# Patient Record
Sex: MALE | Race: WHITE | Employment: OTHER | URBAN - METROPOLITAN AREA
[De-identification: names, ages, dates, MRNs, and addresses within clinical notes are randomized per-mention and may not be internally consistent; named-entity substitution may affect disease eponyms.]

---

## 2017-07-17 ENCOUNTER — TELEPHONE (OUTPATIENT)
Dept: FAMILY MEDICINE CLINIC | Age: 62
End: 2017-07-17

## 2017-07-17 DIAGNOSIS — R73.01 IFG (IMPAIRED FASTING GLUCOSE): Primary | ICD-10-CM

## 2017-07-17 DIAGNOSIS — E78.00 ELEVATED LDL CHOLESTEROL LEVEL: ICD-10-CM

## 2018-02-28 ENCOUNTER — HOSPITAL ENCOUNTER (OUTPATIENT)
Dept: CT IMAGING | Age: 63
Discharge: HOME | End: 2018-03-02
Payer: COMMERCIAL

## 2018-02-28 DIAGNOSIS — H92.02 OTALGIA OF LEFT EAR: ICD-10-CM

## 2018-02-28 DIAGNOSIS — H93.8X2 CONGESTION OF LEFT EAR: ICD-10-CM

## 2018-02-28 PROCEDURE — 76380 CAT SCAN FOLLOW-UP STUDY: CPT

## 2018-04-17 ENCOUNTER — HOSPITAL ENCOUNTER (OUTPATIENT)
Age: 63
Setting detail: SPECIMEN
Discharge: HOME | End: 2018-04-17
Payer: MEDICARE

## 2018-04-17 DIAGNOSIS — R73.01 IFG (IMPAIRED FASTING GLUCOSE): ICD-10-CM

## 2018-04-17 DIAGNOSIS — E78.00 ELEVATED LDL CHOLESTEROL LEVEL: ICD-10-CM

## 2018-04-17 LAB
ALBUMIN SERPL-MCNC: 4.3 G/DL (ref 3.5–5.2)
ALBUMIN/GLOBULIN RATIO: 1.3 (ref 1–2.5)
ALP BLD-CCNC: 60 U/L (ref 40–129)
ALT SERPL-CCNC: 22 U/L (ref 5–41)
ANION GAP SERPL CALCULATED.3IONS-SCNC: 20 MMOL/L (ref 9–17)
AST SERPL-CCNC: 21 U/L
BILIRUB SERPL-MCNC: 1 MG/DL (ref 0.3–1.2)
BUN BLDV-MCNC: 16 MG/DL (ref 8–23)
BUN/CREAT BLD: ABNORMAL (ref 9–20)
CALCIUM SERPL-MCNC: 9.1 MG/DL (ref 8.6–10.4)
CHLORIDE BLD-SCNC: 99 MMOL/L (ref 98–107)
CHOLESTEROL/HDL RATIO: 4.6
CHOLESTEROL: 198 MG/DL
CO2: 20 MMOL/L (ref 20–31)
CREAT SERPL-MCNC: 1.04 MG/DL (ref 0.7–1.2)
GFR AFRICAN AMERICAN: >60 ML/MIN
GFR NON-AFRICAN AMERICAN: >60 ML/MIN
GFR SERPL CREATININE-BSD FRML MDRD: ABNORMAL ML/MIN/{1.73_M2}
GFR SERPL CREATININE-BSD FRML MDRD: ABNORMAL ML/MIN/{1.73_M2}
GLUCOSE BLD-MCNC: 264 MG/DL (ref 70–99)
HDLC SERPL-MCNC: 43 MG/DL
LDL CHOLESTEROL: 124 MG/DL (ref 0–130)
POTASSIUM SERPL-SCNC: 3.9 MMOL/L (ref 3.7–5.3)
SODIUM BLD-SCNC: 139 MMOL/L (ref 135–144)
TOTAL PROTEIN: 7.6 G/DL (ref 6.4–8.3)
TRIGL SERPL-MCNC: 155 MG/DL
VLDLC SERPL CALC-MCNC: ABNORMAL MG/DL (ref 1–30)

## 2018-04-19 ENCOUNTER — OFFICE VISIT (OUTPATIENT)
Dept: FAMILY MEDICINE CLINIC | Age: 63
End: 2018-04-19
Payer: MEDICARE

## 2018-04-19 VITALS
HEIGHT: 71 IN | SYSTOLIC BLOOD PRESSURE: 134 MMHG | DIASTOLIC BLOOD PRESSURE: 90 MMHG | HEART RATE: 94 BPM | OXYGEN SATURATION: 98 % | WEIGHT: 217.38 LBS | BODY MASS INDEX: 30.43 KG/M2

## 2018-04-19 DIAGNOSIS — Z72.89 OTHER PROBLEMS RELATED TO LIFESTYLE: ICD-10-CM

## 2018-04-19 DIAGNOSIS — E11.9 TYPE 2 DIABETES MELLITUS WITHOUT COMPLICATION, WITHOUT LONG-TERM CURRENT USE OF INSULIN (HCC): Primary | ICD-10-CM

## 2018-04-19 PROCEDURE — G8417 CALC BMI ABV UP PARAM F/U: HCPCS | Performed by: FAMILY MEDICINE

## 2018-04-19 PROCEDURE — 1036F TOBACCO NON-USER: CPT | Performed by: FAMILY MEDICINE

## 2018-04-19 PROCEDURE — 4040F PNEUMOC VAC/ADMIN/RCVD: CPT | Performed by: FAMILY MEDICINE

## 2018-04-19 PROCEDURE — G8427 DOCREV CUR MEDS BY ELIG CLIN: HCPCS | Performed by: FAMILY MEDICINE

## 2018-04-19 PROCEDURE — 2022F DILAT RTA XM EVC RTNOPTHY: CPT | Performed by: FAMILY MEDICINE

## 2018-04-19 PROCEDURE — 1123F ACP DISCUSS/DSCN MKR DOCD: CPT | Performed by: FAMILY MEDICINE

## 2018-04-19 PROCEDURE — 99214 OFFICE O/P EST MOD 30 MIN: CPT | Performed by: FAMILY MEDICINE

## 2018-04-19 PROCEDURE — 3017F COLORECTAL CA SCREEN DOC REV: CPT | Performed by: FAMILY MEDICINE

## 2018-04-19 PROCEDURE — 3046F HEMOGLOBIN A1C LEVEL >9.0%: CPT | Performed by: FAMILY MEDICINE

## 2018-04-19 RX ORDER — METFORMIN HYDROCHLORIDE 500 MG/1
TABLET, EXTENDED RELEASE ORAL
Qty: 120 TABLET | Refills: 11 | Status: SHIPPED | OUTPATIENT
Start: 2018-04-19 | End: 2018-04-19 | Stop reason: SDUPTHER

## 2018-04-19 RX ORDER — METFORMIN HYDROCHLORIDE 500 MG/1
TABLET, EXTENDED RELEASE ORAL
Qty: 120 TABLET | Refills: 11 | Status: SHIPPED | OUTPATIENT
Start: 2018-04-19 | End: 2018-07-31 | Stop reason: SDUPTHER

## 2018-04-19 ASSESSMENT — PATIENT HEALTH QUESTIONNAIRE - PHQ9
1. LITTLE INTEREST OR PLEASURE IN DOING THINGS: 0
SUM OF ALL RESPONSES TO PHQ9 QUESTIONS 1 & 2: 0
2. FEELING DOWN, DEPRESSED OR HOPELESS: 0
SUM OF ALL RESPONSES TO PHQ QUESTIONS 1-9: 0

## 2018-04-20 ENCOUNTER — HOSPITAL ENCOUNTER (OUTPATIENT)
Age: 63
Setting detail: SPECIMEN
Discharge: HOME | End: 2018-04-20
Payer: MEDICARE

## 2018-04-20 DIAGNOSIS — E11.9 TYPE 2 DIABETES MELLITUS WITHOUT COMPLICATION, WITHOUT LONG-TERM CURRENT USE OF INSULIN (HCC): ICD-10-CM

## 2018-04-20 DIAGNOSIS — Z72.89 OTHER PROBLEMS RELATED TO LIFESTYLE: ICD-10-CM

## 2018-04-20 LAB
CHOLESTEROL/HDL RATIO: 5.6
CHOLESTEROL: 208 MG/DL
CREATININE URINE: 323.1 MG/DL (ref 39–259)
ESTIMATED AVERAGE GLUCOSE: 326 MG/DL
HBA1C MFR BLD: 13 % (ref 4–6)
HDLC SERPL-MCNC: 37 MG/DL
HEPATITIS C ANTIBODY: NONREACTIVE
LDL CHOLESTEROL: 145 MG/DL (ref 0–130)
MICROALBUMIN/CREAT 24H UR: 20 MG/L
MICROALBUMIN/CREAT UR-RTO: 6 MCG/MG CREAT
TRIGL SERPL-MCNC: 132 MG/DL
VLDLC SERPL CALC-MCNC: ABNORMAL MG/DL (ref 1–30)

## 2018-04-27 ENCOUNTER — OFFICE VISIT (OUTPATIENT)
Dept: FAMILY MEDICINE CLINIC | Age: 63
End: 2018-04-27
Payer: MEDICARE

## 2018-04-27 VITALS
HEART RATE: 93 BPM | OXYGEN SATURATION: 98 % | DIASTOLIC BLOOD PRESSURE: 82 MMHG | SYSTOLIC BLOOD PRESSURE: 138 MMHG | BODY MASS INDEX: 30.4 KG/M2 | WEIGHT: 218 LBS

## 2018-04-27 DIAGNOSIS — E11.9 TYPE 2 DIABETES MELLITUS WITHOUT COMPLICATION, WITHOUT LONG-TERM CURRENT USE OF INSULIN (HCC): Primary | ICD-10-CM

## 2018-04-27 PROCEDURE — 3046F HEMOGLOBIN A1C LEVEL >9.0%: CPT | Performed by: FAMILY MEDICINE

## 2018-04-27 PROCEDURE — 1036F TOBACCO NON-USER: CPT | Performed by: FAMILY MEDICINE

## 2018-04-27 PROCEDURE — G8427 DOCREV CUR MEDS BY ELIG CLIN: HCPCS | Performed by: FAMILY MEDICINE

## 2018-04-27 PROCEDURE — G8417 CALC BMI ABV UP PARAM F/U: HCPCS | Performed by: FAMILY MEDICINE

## 2018-04-27 PROCEDURE — 3017F COLORECTAL CA SCREEN DOC REV: CPT | Performed by: FAMILY MEDICINE

## 2018-04-27 PROCEDURE — 2022F DILAT RTA XM EVC RTNOPTHY: CPT | Performed by: FAMILY MEDICINE

## 2018-04-27 PROCEDURE — 1123F ACP DISCUSS/DSCN MKR DOCD: CPT | Performed by: FAMILY MEDICINE

## 2018-04-27 PROCEDURE — 99213 OFFICE O/P EST LOW 20 MIN: CPT | Performed by: FAMILY MEDICINE

## 2018-04-27 PROCEDURE — 4040F PNEUMOC VAC/ADMIN/RCVD: CPT | Performed by: FAMILY MEDICINE

## 2018-05-11 ENCOUNTER — HOSPITAL ENCOUNTER (OUTPATIENT)
Dept: DIABETES SERVICES | Age: 63
Setting detail: THERAPIES SERIES
Discharge: HOME | End: 2018-05-11
Payer: MEDICARE

## 2018-05-11 DIAGNOSIS — E11.9 TYPE 2 DIABETES MELLITUS WITHOUT COMPLICATION, WITHOUT LONG-TERM CURRENT USE OF INSULIN (HCC): Primary | ICD-10-CM

## 2018-05-11 PROCEDURE — G0108 DIAB MANAGE TRN  PER INDIV: HCPCS

## 2018-07-24 ENCOUNTER — PATIENT MESSAGE (OUTPATIENT)
Dept: FAMILY MEDICINE CLINIC | Age: 63
End: 2018-07-24

## 2018-07-27 NOTE — TELEPHONE ENCOUNTER
From: Dori Lefort, MD  To: Brigitte Castaneda  Sent: 7/24/2018 9:58 AM EDT  Subject: glucose readings    Overall these look good. Go ahead an get the hba1c now.  It has been 12 weeks        Radhames Valdes M.D.  Kiki Raw [address] [place] [address]    (N) [phone]  (H) [phone]    Electronically signed on 7/24/2018 at 9:57 AM

## 2018-07-30 ENCOUNTER — HOSPITAL ENCOUNTER (OUTPATIENT)
Age: 63
Setting detail: SPECIMEN
Discharge: HOME | End: 2018-07-30
Payer: MEDICARE

## 2018-07-30 DIAGNOSIS — E11.9 TYPE 2 DIABETES MELLITUS WITHOUT COMPLICATION, WITHOUT LONG-TERM CURRENT USE OF INSULIN (HCC): ICD-10-CM

## 2018-07-30 LAB
ESTIMATED AVERAGE GLUCOSE: 117 MG/DL
HBA1C MFR BLD: 5.7 % (ref 4–6)

## 2018-07-31 DIAGNOSIS — E11.9 TYPE 2 DIABETES MELLITUS WITHOUT COMPLICATION, WITHOUT LONG-TERM CURRENT USE OF INSULIN (HCC): Primary | Chronic | ICD-10-CM

## 2018-07-31 RX ORDER — METFORMIN HYDROCHLORIDE 500 MG/1
TABLET, EXTENDED RELEASE ORAL
Qty: 60 TABLET | Refills: 11 | Status: SHIPPED | OUTPATIENT
Start: 2018-07-31 | End: 2019-02-06 | Stop reason: SDUPTHER

## 2018-08-02 ENCOUNTER — OFFICE VISIT (OUTPATIENT)
Dept: FAMILY MEDICINE CLINIC | Age: 63
End: 2018-08-02
Payer: MEDICARE

## 2018-08-02 VITALS
DIASTOLIC BLOOD PRESSURE: 82 MMHG | BODY MASS INDEX: 27.89 KG/M2 | SYSTOLIC BLOOD PRESSURE: 136 MMHG | HEART RATE: 78 BPM | WEIGHT: 200 LBS | OXYGEN SATURATION: 98 %

## 2018-08-02 DIAGNOSIS — E11.9 TYPE 2 DIABETES MELLITUS WITHOUT COMPLICATION, WITHOUT LONG-TERM CURRENT USE OF INSULIN (HCC): Primary | ICD-10-CM

## 2018-08-02 PROCEDURE — 1036F TOBACCO NON-USER: CPT | Performed by: FAMILY MEDICINE

## 2018-08-02 PROCEDURE — 1101F PT FALLS ASSESS-DOCD LE1/YR: CPT | Performed by: FAMILY MEDICINE

## 2018-08-02 PROCEDURE — 99213 OFFICE O/P EST LOW 20 MIN: CPT | Performed by: FAMILY MEDICINE

## 2018-08-02 PROCEDURE — G8427 DOCREV CUR MEDS BY ELIG CLIN: HCPCS | Performed by: FAMILY MEDICINE

## 2018-08-02 PROCEDURE — 3044F HG A1C LEVEL LT 7.0%: CPT | Performed by: FAMILY MEDICINE

## 2018-08-02 PROCEDURE — 3017F COLORECTAL CA SCREEN DOC REV: CPT | Performed by: FAMILY MEDICINE

## 2018-08-02 PROCEDURE — 4040F PNEUMOC VAC/ADMIN/RCVD: CPT | Performed by: FAMILY MEDICINE

## 2018-08-02 PROCEDURE — 2022F DILAT RTA XM EVC RTNOPTHY: CPT | Performed by: FAMILY MEDICINE

## 2018-08-02 PROCEDURE — G8417 CALC BMI ABV UP PARAM F/U: HCPCS | Performed by: FAMILY MEDICINE

## 2018-08-02 PROCEDURE — 1123F ACP DISCUSS/DSCN MKR DOCD: CPT | Performed by: FAMILY MEDICINE

## 2018-08-02 NOTE — PROGRESS NOTES
Visit Information    Have you changed or started any medications since your last visit including any over-the-counter medicines, vitamins, or herbal medicines? no   Are you having any side effects from any of your medications? -  no  Have you stopped taking any of your medications? Is so, why? -  no    Have you seen any other physician or provider since your last visit? No  Have you had any other diagnostic tests since your last visit? No  Have you been seen in the emergency room and/or had an admission to a hospital since we last saw you? No  Have you had your routine dental cleaning in the past 6 months? yes -     Have you activated your [employer] account? If not, what are your barriers?  Yes     Patient Care Team:  Kristi Cummins MD as PCP - General    Medical History Review  Past Medical, Family, and Social History reviewed and does not contribute to the patient presenting condition    Health Maintenance   Topic Date Due    Diabetic foot exam  03/13/1963    Diabetic retinal exam  03/13/1963    HIV screen  03/13/1968    Shingles Vaccine (1 of 2 - 2 Dose Series) 03/13/2003    Pneumococcal low/med risk (1 of 2 - PCV13) 03/13/2018    Flu vaccine (1) 09/01/2018    Diabetic microalbuminuria test  04/20/2019    Lipid screen  04/20/2019    A1C test (Diabetic or Prediabetic)  07/30/2019    Colon cancer screen colonoscopy  12/17/2020    DTaP/Tdap/Td vaccine (2 - Td) 09/04/2024    Hepatitis C screen  Completed

## 2018-08-07 ENCOUNTER — HOSPITAL ENCOUNTER (OUTPATIENT)
Dept: DIABETES SERVICES | Age: 63
Setting detail: THERAPIES SERIES
Discharge: HOME | End: 2018-08-07
Payer: MEDICARE

## 2018-08-07 PROCEDURE — G0108 DIAB MANAGE TRN  PER INDIV: HCPCS

## 2018-08-07 NOTE — PROGRESS NOTES
Identify symptoms of hyper & hypoglycemia, and prevention & treatment strategies. 1 8/7/18RS   Was having symptoms of high BG - frequency and thrist in march prior to DX    8/7/18RS- states now feeling great - wife feels not eating enough on the days he is fasting and is grumpy      Describe sick day guidelines & indications for  physician notification. Identify short term consequences of poor control. 1       Prevention, detection & treatment of chronic complications:  Define the natural course of diabetes & describe the relationship of blood glucose levels to long term complications of diabetes. Identify preventative measures & standards of care. 1 8/7/18RS   High cholesterol - --   Lab Results   Component Value Date    LDLCALC 122 01/05/2015    LDLCHOLESTEROL 145 (H) 04/20/2018   Increased intake of steak and meats to lower BG - discussed that balance and lean meats - 8/7/18RS   Developing strategies to address psychosocial issues:  Describe feelings about living with diabetes; Describe how stress, depression & anxiety affect blood glucose; Identify coping strategies; Identify support needed & support network available. 1 8/7/18RS   Very motivated to change     Wife Mort Cam is very supportive    8/7/18RS- action oriented and does not want to \"be diabetic\"   Developing strategies to promote health/change behavior: Identify 7 self-care behaviors; Personal health risk factors; Benefits, challenges & strategies for behavioral change;    1         Individualized goal selection. My goal , to help me improve my health, I will:   Healthy Eating     1.practice eating patterns - 3 meals ( add back in a breakfast)     2. Veggies and berries for snacks       Plan  Follow-up Appointments planned in individual setting. Next Appointment in 3 months.     Instruction Method: [x]Lecture/Discussion  []Power Point Presentation  [x]Handouts  []Return Demonstration      Education Materials/Equipment Provided: 1 - Understanding diabetes 8/7/18RS  8 30   900  x with wife Agustin    Class 2- Nutrition and diabetes          Class 3 - Preventing Complications         Class 4 -  In depth Nutrition and sick day care        Class 5 - 3 month follow up / goal reassessment        Gestational - RN         Gestational - RD        Individual MNT         Shared Med Appt         Yearly Follow-up        Meter Instrx        Insulin Instrx      []Pen  []Vial & Syringe      DSMS Support Plan:  Follow-up plan:     [] MNT referral request / Appointment     [] Annual update referral request and appointment  after      [x]ADA  Where do I Begin, Living with Type 2 diabetes ADA home support program--5/11/18RS   [] Healthy U - Diabetes workshops via [facility]        [] Starting [employer] program /  [employer] [phone]    []  Support Group / [employer] - Free 6 week diabetes education support   classes Almaz Mares [phone]      []   [employer] application  / scholarship program     []ADA care 4 life facundo      []  Internet web sites - ADA and D- life    Post Education Referrals:      [] [facility] information sheet and [address] [facility] , 21       [] Dental care - Dental care of [employer]     [] [facility] ([facility]) link  phone number - for information and referral to [facility]  Clinically  [address], WEIGHT MANAGEMENT        []Other  Kaykay Brasher RN

## 2018-09-04 ENCOUNTER — HOSPITAL ENCOUNTER (OUTPATIENT)
Dept: DIABETES SERVICES | Age: 63
Setting detail: THERAPIES SERIES
Discharge: HOME | End: 2018-09-04
Payer: MEDICARE

## 2018-09-04 ENCOUNTER — PATIENT MESSAGE (OUTPATIENT)
Dept: FAMILY MEDICINE CLINIC | Age: 63
End: 2018-09-04

## 2018-09-04 DIAGNOSIS — E11.9 TYPE 2 DIABETES MELLITUS WITHOUT COMPLICATION, WITHOUT LONG-TERM CURRENT USE OF INSULIN (HCC): Primary | ICD-10-CM

## 2018-09-04 DIAGNOSIS — E11.9 TYPE 2 DIABETES MELLITUS WITHOUT COMPLICATION, WITHOUT LONG-TERM CURRENT USE OF INSULIN (HCC): Primary | Chronic | ICD-10-CM

## 2018-09-04 PROCEDURE — G0108 DIAB MANAGE TRN  PER INDIV: HCPCS

## 2018-09-04 NOTE — TELEPHONE ENCOUNTER
From: Christina Winslow MD  To: Jayesh Woodall  Sent: 9/4/2018 6:37 AM EDT  Subject: glucose    Your readings look good. Watch for the hypoglycemia.         Diana Ny M.D.  Alexandra Souza [address] [place] [address], [facility]    R) [phone] (e) [phone]    Electronically signed on 9/4/2018 at 6:37 AM

## 2018-09-04 NOTE — PROGRESS NOTES
fasting 16 to 24 hours on tues and thurs-- plan follow up discussion with RD on his plan  He stated eating lots of low CHO veggies - meats and nuts - some berries - use of stevia in tea  Wants to know if any professional ( MD)  to talk to about nutrients / nutrition medicaine    Didn't give carb plan as what patient is doing is working. calories/ day   CHO choices/ meal   CHO choices/  day   grams of protein /day   gram of fat /day     Correctly read food labels & demonstrate CHO counting & portion control with personalized meal plan. Identify dining out strategies, & dietary sick day guidelines. 1 9/4/18 JW      Incorporating physical activity into lifestyle:   Verbalize effect of exercise on blood glucose levels; benefits of regular exercise; safety considerations; contraindications; maintenance of activity. 1 8/7/18RS    very active base line - [employer] yoga walking swimming   Using medications safely:  Identify effects of diabetes medicines on blood glucose levels; List diabetes medication taken, action & side effects;    1    Started on metformin - taking 2 x per day 500mg  Does not want to increase dose  If BG ok and would like to not have to take    8/7/18RS- dose on metformin decreased by 1/2 now only 500mg in am and pm    Insulin / Injectable - Appropriate injection sites; proper storage; supplies needed; proper technique; safe needle disposal guidelines. 1    Does not want insulin    Briefly discussed GLP 1 RA    Monitoring blood glucose, interpreting and using results:  Identify recommended & personal blood glucose targets; importance of testing; testing supplies; HgbA1C target levels; Factors affecting blood glucose;  Importance of logging blood glucose levels for pattern recognition; ketone testing; safe lancet disposal.   1 8/7/18RS    purchases own BG meter and strips and is checking -  Fasting are in low 100's now     8/7/18RS--Checking BG on his own - noted higher BG in wake up ( ie be from NDE      []Self-Management Gestational - RD class - My Food Plan for Gestational diabetes    []Glucose Meter     []Insulin Kit     []Other      Encounter Type Date Start Time End Time Comments No Show Dates   Assessment 5/11/18RS   8 30   9 30   [x]In Person  []Telephone    Class 1 - Understanding diabetes 8/7/18RS  8 27   900  x with wife Agustin    Class 2- Nutrition and diabetes   9/4/18 JW 8:30 9:30 x    Class 3 - Preventing Complications         Class 4 -  In depth Nutrition and sick day care        Class 5 - 3 month follow up / goal reassessment        Gestational - RN         Gestational - RD        Individual MNT         Shared Med Appt         Yearly Follow-up        Meter Instrx        Insulin Instrx      []Pen  []Vial & Syringe      DSMS Support Plan:  Follow-up plan:     [] MNT referral request / Appointment     [] Annual update referral request and appointment  after      [x]ADA  Where do I Begin, Living with Type 2 diabetes ADA home support program--5/11/18RS   [] Healthy U - Diabetes workshops via [address] [place] [address]       [] Starting [employer] program /  [employer] [phone]    []  Support Group / Alvino Brandon [address] 6 week diabetes education support   classes Cole Jordon [phone]      []   [employer] application  / scholarship program     []ADA care 4 life facundo      []  Internet web sites - ADA and D- life    Post Education Referrals:      [] [facility] information sheet and [address] [facility], [facility] , 21       [] Dental care - Dental care of [employer]     [] [facility] ([facility]) link  phone number - for information and referral to [facility]  Clinically  [address], WOUND, WEIGHT MANAGEMENT        []Other  Loi Tobin, RD, LD

## 2018-09-10 ENCOUNTER — TELEPHONE (OUTPATIENT)
Dept: FAMILY MEDICINE CLINIC | Age: 63
End: 2018-09-10

## 2018-09-10 DIAGNOSIS — E11.9 CONTROLLED TYPE 2 DIABETES MELLITUS WITHOUT COMPLICATION, WITHOUT LONG-TERM CURRENT USE OF INSULIN (HCC): Primary | ICD-10-CM

## 2018-09-10 RX ORDER — FLASH GLUCOSE SENSOR
KIT MISCELLANEOUS
Qty: 1 DEVICE | Refills: 1 | Status: ON HOLD | OUTPATIENT
Start: 2018-09-10 | End: 2020-01-04 | Stop reason: HOSPADM

## 2018-09-10 RX ORDER — FLASH GLUCOSE SENSOR
KIT MISCELLANEOUS
Qty: 1 DEVICE | Refills: 3 | Status: SHIPPED | OUTPATIENT
Start: 2018-09-10 | End: 2018-09-10 | Stop reason: SDUPTHER

## 2018-09-10 RX ORDER — FLASH GLUCOSE SENSOR
KIT MISCELLANEOUS
Qty: 3 EACH | Refills: 3 | Status: SHIPPED | OUTPATIENT
Start: 2018-09-10 | End: 2019-01-28 | Stop reason: SDUPTHER

## 2018-09-10 RX ORDER — FLASH GLUCOSE SENSOR
KIT MISCELLANEOUS
Qty: 1 EACH | Refills: 5 | Status: SHIPPED | OUTPATIENT
Start: 2018-09-10 | End: 2018-09-10 | Stop reason: SDUPTHER

## 2018-09-11 NOTE — TELEPHONE ENCOUNTER
SPoke with pharmacist who stated that insurance did cover a portion for the meter and sensors. Called patient to inform him of this as patient stated yesterday the pharmacist figured it out. Insurance did end up paying a portion.

## 2018-10-09 ENCOUNTER — HOSPITAL ENCOUNTER (OUTPATIENT)
Dept: DIABETES SERVICES | Age: 63
Setting detail: THERAPIES SERIES
Discharge: HOME | End: 2018-10-09
Payer: MEDICARE

## 2018-10-09 DIAGNOSIS — E11.9 TYPE 2 DIABETES MELLITUS WITHOUT COMPLICATION, WITHOUT LONG-TERM CURRENT USE OF INSULIN (HCC): Primary | ICD-10-CM

## 2018-10-09 PROCEDURE — G0108 DIAB MANAGE TRN  PER INDIV: HCPCS

## 2018-10-09 NOTE — PROGRESS NOTES
of meals and snacks, and How much to eat - portions control. encouraged more intake at [facility] - try smoothie with kefir or egg white with veggies  - especially if going to exercise    8/7/18RS-Now fasting 16 to 24 hours on tues and thurs-- plan follow up discussion with RD on his plan  He stated eating lots of low CHO veggies - meats and nuts - some berries - use of stevia in tea  Wants to know if any professional ( MD)  to talk to about nutrients / nutrition medicaine    Didn't give carb plan as what patient is doing is working. calories/ day   CHO choices/ meal   CHO choices/  day   grams of protein /day   gram of fat /day     Correctly read food labels & demonstrate CHO counting & portion control with personalized meal plan. Identify dining out strategies, & dietary sick day guidelines. 1 9/4/18 JW      Incorporating physical activity into lifestyle:   Verbalize effect of exercise on blood glucose levels; benefits of regular exercise; safety considerations; contraindications; maintenance of activity. 1 8/7/18RS    very active base line - [employer] yoga walking swimming   Using medications safely:  Identify effects of diabetes medicines on blood glucose levels; List diabetes medication taken, action & side effects;    1 10/9/18rs   Started on metformin - taking 2 x per day 500mg  Does not want to increase dose  If BG ok and would like to not have to take    8/7/18RS- dose on metformin decreased by 1/2 now only 500mg in am and     Discussed oral DM meds in general - patient hopes to get off all medications   Insulin / Injectable - Appropriate injection sites; proper storage; supplies needed; proper technique; safe needle disposal guidelines. 1 10/9/18rs   Does not want insulin    Briefly discussed GLP 1 RA    Monitoring blood glucose, interpreting and using results:  Identify recommended & personal blood glucose targets; importance of testing; testing supplies; HgbA1C target levels;  Factors affecting blood

## 2018-11-01 ENCOUNTER — HOSPITAL ENCOUNTER (OUTPATIENT)
Age: 63
Setting detail: SPECIMEN
Discharge: HOME | End: 2018-11-01
Payer: MEDICARE

## 2018-11-01 DIAGNOSIS — E11.9 TYPE 2 DIABETES MELLITUS WITHOUT COMPLICATION, WITHOUT LONG-TERM CURRENT USE OF INSULIN (HCC): Chronic | ICD-10-CM

## 2018-11-01 LAB
ESTIMATED AVERAGE GLUCOSE: 108 MG/DL
HBA1C MFR BLD: 5.4 % (ref 4–6)

## 2018-11-13 ENCOUNTER — TELEPHONE (OUTPATIENT)
Dept: DIABETES SERVICES | Age: 63
End: 2018-11-13

## 2019-01-28 DIAGNOSIS — E11.9 CONTROLLED TYPE 2 DIABETES MELLITUS WITHOUT COMPLICATION, WITHOUT LONG-TERM CURRENT USE OF INSULIN (HCC): ICD-10-CM

## 2019-01-28 RX ORDER — FLASH GLUCOSE SENSOR
KIT MISCELLANEOUS
Qty: 100 EACH | Refills: 2 | Status: ON HOLD | OUTPATIENT
Start: 2019-01-28 | End: 2020-01-04 | Stop reason: HOSPADM

## 2019-02-05 ENCOUNTER — OFFICE VISIT (OUTPATIENT)
Dept: FAMILY MEDICINE CLINIC | Age: 64
End: 2019-02-05
Payer: MEDICARE

## 2019-02-05 ENCOUNTER — HOSPITAL ENCOUNTER (OUTPATIENT)
Age: 64
Setting detail: SPECIMEN
Discharge: HOME | End: 2019-02-05
Payer: MEDICARE

## 2019-02-05 VITALS
HEART RATE: 78 BPM | SYSTOLIC BLOOD PRESSURE: 130 MMHG | OXYGEN SATURATION: 98 % | WEIGHT: 213 LBS | BODY MASS INDEX: 29.71 KG/M2 | DIASTOLIC BLOOD PRESSURE: 84 MMHG

## 2019-02-05 DIAGNOSIS — E11.9 CONTROLLED TYPE 2 DIABETES MELLITUS WITHOUT COMPLICATION, WITHOUT LONG-TERM CURRENT USE OF INSULIN (HCC): ICD-10-CM

## 2019-02-05 DIAGNOSIS — E11.9 CONTROLLED TYPE 2 DIABETES MELLITUS WITHOUT COMPLICATION, WITHOUT LONG-TERM CURRENT USE OF INSULIN (HCC): Primary | ICD-10-CM

## 2019-02-05 LAB
ABSOLUTE EOS #: 0.05 K/UL (ref 0–0.44)
ABSOLUTE IMMATURE GRANULOCYTE: <0.03 K/UL (ref 0–0.3)
ABSOLUTE LYMPH #: 1.45 K/UL (ref 1.1–3.7)
ABSOLUTE MONO #: 0.57 K/UL (ref 0.1–1.2)
ALBUMIN SERPL-MCNC: 4.5 G/DL (ref 3.5–5.2)
ALBUMIN/GLOBULIN RATIO: 1.6 (ref 1–2.5)
ALP BLD-CCNC: 49 U/L (ref 40–129)
ALT SERPL-CCNC: 17 U/L (ref 5–41)
ANION GAP SERPL CALCULATED.3IONS-SCNC: 10 MMOL/L (ref 9–17)
AST SERPL-CCNC: 21 U/L
BASOPHILS # BLD: 1 % (ref 0–2)
BASOPHILS ABSOLUTE: 0.05 K/UL (ref 0–0.2)
BILIRUB SERPL-MCNC: 0.53 MG/DL (ref 0.3–1.2)
BUN BLDV-MCNC: 21 MG/DL (ref 8–23)
BUN/CREAT BLD: ABNORMAL (ref 9–20)
CALCIUM SERPL-MCNC: 9.5 MG/DL (ref 8.6–10.4)
CHLORIDE BLD-SCNC: 105 MMOL/L (ref 98–107)
CHOLESTEROL/HDL RATIO: 3.5
CHOLESTEROL: 178 MG/DL
CO2: 24 MMOL/L (ref 20–31)
CREAT SERPL-MCNC: 1.21 MG/DL (ref 0.7–1.2)
CREATININE URINE: 62.4 MG/DL (ref 39–259)
DIFFERENTIAL TYPE: NORMAL
EOSINOPHILS RELATIVE PERCENT: 1 % (ref 1–4)
ESTIMATED AVERAGE GLUCOSE: 117 MG/DL
GFR AFRICAN AMERICAN: >60 ML/MIN
GFR NON-AFRICAN AMERICAN: >60 ML/MIN
GFR SERPL CREATININE-BSD FRML MDRD: ABNORMAL ML/MIN/{1.73_M2}
GFR SERPL CREATININE-BSD FRML MDRD: ABNORMAL ML/MIN/{1.73_M2}
GLUCOSE BLD-MCNC: 108 MG/DL (ref 70–99)
HBA1C MFR BLD: 5.7 % (ref 4–6)
HCT VFR BLD CALC: 46.6 % (ref 40.7–50.3)
HDLC SERPL-MCNC: 51 MG/DL
HEMOGLOBIN: 15.5 G/DL (ref 13–17)
IMMATURE GRANULOCYTES: 0 %
LDL CHOLESTEROL: 117 MG/DL (ref 0–130)
LYMPHOCYTES # BLD: 26 % (ref 24–43)
MCH RBC QN AUTO: 31.3 PG (ref 25.2–33.5)
MCHC RBC AUTO-ENTMCNC: 33.3 G/DL (ref 28.4–34.8)
MCV RBC AUTO: 94 FL (ref 82.6–102.9)
MICROALBUMIN/CREAT 24H UR: <12 MG/L
MICROALBUMIN/CREAT UR-RTO: NORMAL MCG/MG CREAT
MONOCYTES # BLD: 10 % (ref 3–12)
NRBC AUTOMATED: 0 PER 100 WBC
PDW BLD-RTO: 12.5 % (ref 11.8–14.4)
PLATELET # BLD: 177 K/UL (ref 138–453)
PLATELET ESTIMATE: NORMAL
PMV BLD AUTO: 10.9 FL (ref 8.1–13.5)
POTASSIUM SERPL-SCNC: 4.8 MMOL/L (ref 3.7–5.3)
RBC # BLD: 4.96 M/UL (ref 4.21–5.77)
RBC # BLD: NORMAL 10*6/UL
SEG NEUTROPHILS: 62 % (ref 36–65)
SEGMENTED NEUTROPHILS ABSOLUTE COUNT: 3.39 K/UL (ref 1.5–8.1)
SODIUM BLD-SCNC: 139 MMOL/L (ref 135–144)
TOTAL PROTEIN: 7.3 G/DL (ref 6.4–8.3)
TRIGL SERPL-MCNC: 52 MG/DL
VLDLC SERPL CALC-MCNC: NORMAL MG/DL (ref 1–30)
WBC # BLD: 5.5 K/UL (ref 3.5–11.3)
WBC # BLD: NORMAL 10*3/UL

## 2019-02-05 PROCEDURE — 2022F DILAT RTA XM EVC RTNOPTHY: CPT | Performed by: FAMILY MEDICINE

## 2019-02-05 PROCEDURE — 4040F PNEUMOC VAC/ADMIN/RCVD: CPT | Performed by: FAMILY MEDICINE

## 2019-02-05 PROCEDURE — 1123F ACP DISCUSS/DSCN MKR DOCD: CPT | Performed by: FAMILY MEDICINE

## 2019-02-05 PROCEDURE — 1101F PT FALLS ASSESS-DOCD LE1/YR: CPT | Performed by: FAMILY MEDICINE

## 2019-02-05 PROCEDURE — G8484 FLU IMMUNIZE NO ADMIN: HCPCS | Performed by: FAMILY MEDICINE

## 2019-02-05 PROCEDURE — G8427 DOCREV CUR MEDS BY ELIG CLIN: HCPCS | Performed by: FAMILY MEDICINE

## 2019-02-05 PROCEDURE — 1036F TOBACCO NON-USER: CPT | Performed by: FAMILY MEDICINE

## 2019-02-05 PROCEDURE — 99213 OFFICE O/P EST LOW 20 MIN: CPT | Performed by: FAMILY MEDICINE

## 2019-02-05 PROCEDURE — 3017F COLORECTAL CA SCREEN DOC REV: CPT | Performed by: FAMILY MEDICINE

## 2019-02-05 PROCEDURE — 3046F HEMOGLOBIN A1C LEVEL >9.0%: CPT | Performed by: FAMILY MEDICINE

## 2019-02-05 PROCEDURE — G8417 CALC BMI ABV UP PARAM F/U: HCPCS | Performed by: FAMILY MEDICINE

## 2019-02-06 ENCOUNTER — PATIENT MESSAGE (OUTPATIENT)
Dept: FAMILY MEDICINE CLINIC | Age: 64
End: 2019-02-06

## 2019-02-06 DIAGNOSIS — E11.9 TYPE 2 DIABETES MELLITUS WITHOUT COMPLICATION, WITHOUT LONG-TERM CURRENT USE OF INSULIN (HCC): Primary | Chronic | ICD-10-CM

## 2019-02-06 RX ORDER — METFORMIN HYDROCHLORIDE 500 MG/1
TABLET, EXTENDED RELEASE ORAL
Qty: 30 TABLET | Refills: 11 | Status: SHIPPED | OUTPATIENT
Start: 2019-02-06 | End: 2019-06-21 | Stop reason: SDUPTHER

## 2019-03-11 DIAGNOSIS — E11.8 TYPE 2 DIABETES MELLITUS WITH COMPLICATION, UNSPECIFIED WHETHER LONG TERM INSULIN USE: Primary | ICD-10-CM

## 2019-03-11 RX ORDER — FLASH GLUCOSE SCANNING READER
1 EACH MISCELLANEOUS CONTINUOUS
Qty: 1 DEVICE | Refills: 11 | Status: SHIPPED | OUTPATIENT
Start: 2019-03-11

## 2019-03-11 RX ORDER — FLASH GLUCOSE SENSOR
KIT MISCELLANEOUS
Qty: 2 EACH | Refills: 11 | Status: SHIPPED | OUTPATIENT
Start: 2019-03-11

## 2019-06-21 RX ORDER — METFORMIN HYDROCHLORIDE 500 MG/1
TABLET, EXTENDED RELEASE ORAL
Qty: 30 TABLET | Refills: 11 | Status: SHIPPED | OUTPATIENT
Start: 2019-06-21 | End: 2020-01-03 | Stop reason: SDUPTHER

## 2019-06-21 NOTE — TELEPHONE ENCOUNTER
Last visit: 2/5/19  Last Med refill: 2/6/19  Does patient have enough medication for 72 hours:     Next Visit Date:  Future Appointments   Date Time Provider Shaniqua Perez   8/6/2019  8:00 AM Genny Koyanagi,  [address] [facility]   Topic Date Due    Diabetic foot exam  03/13/1963    Diabetic retinal exam  03/13/1963    Annual Wellness Visit (AWV)  03/13/2016    Shingles Vaccine (1 of 2) 12/31/2019 (Originally 3/13/2003)    Pneumococcal 65+ years Vaccine (2 of 2 - PPSV23) 10/01/2019    A1C test (Diabetic or Prediabetic)  02/05/2020    Diabetic microalbuminuria test  02/05/2020    Lipid screen  02/05/2020    Colon cancer screen colonoscopy  12/17/2020    DTaP/Tdap/Td vaccine (2 - Td) 09/04/2024    Flu vaccine  Completed    Hepatitis C screen  Completed       Hemoglobin A1C (%)   Date Value   02/05/2019 5.7   11/01/2018 5.4   07/30/2018 5.7             ( goal A1C is < 7)   Microalb/Crt.  Ratio (mcg/mg creat)   Date Value   02/05/2019 CANNOT BE CALCULATED     LDL Cholesterol (mg/dL)   Date Value   02/05/2019 117   04/20/2018 145 (H)     LDL Calculated (mg/dL)   Date Value   01/05/2015 122   04/30/2012 61       (goal LDL is <100)   AST (U/L)   Date Value   02/05/2019 21     ALT (U/L)   Date Value   02/05/2019 17     BUN (mg/dL)   Date Value   02/05/2019 21     BP Readings from Last 3 Encounters:   02/05/19 130/84   08/02/18 136/82   04/27/18 138/82          (goal 120/80)    All Future Testing planned in [facility]  Lab Frequency Next Occurrence   Hemoglobin A1C Once 05/06/2019               Patient Active Problem List:     Hyperlipidemia     Orthostatic hypotension     Type 2 diabetes mellitus without complication, without long-term current use of insulin ([address]

## 2019-06-25 ENCOUNTER — HOSPITAL ENCOUNTER (EMERGENCY)
Age: 64
Discharge: LEFT BEFORE DISCHARGE | End: 2019-06-25
Attending: EMERGENCY MEDICINE
Payer: MEDICARE

## 2019-06-25 VITALS
HEIGHT: 71 IN | DIASTOLIC BLOOD PRESSURE: 80 MMHG | RESPIRATION RATE: 16 BRPM | SYSTOLIC BLOOD PRESSURE: 135 MMHG | HEART RATE: 74 BPM | TEMPERATURE: 98.1 F | WEIGHT: 222 LBS | BODY MASS INDEX: 31.08 KG/M2 | OXYGEN SATURATION: 99 %

## 2019-06-25 DIAGNOSIS — Z53.21 ELOPED FROM EMERGENCY DEPARTMENT: Primary | ICD-10-CM

## 2019-06-25 PROCEDURE — 99283 EMERGENCY DEPT VISIT LOW MDM: CPT

## 2019-06-25 ASSESSMENT — PAIN DESCRIPTION - LOCATION: LOCATION: KNEE

## 2019-06-25 ASSESSMENT — PAIN DESCRIPTION - ORIENTATION: ORIENTATION: LEFT

## 2019-06-25 ASSESSMENT — PAIN SCALES - GENERAL: PAINLEVEL_OUTOF10: 10

## 2019-06-25 ASSESSMENT — ENCOUNTER SYMPTOMS: COLOR CHANGE: 0

## 2019-06-25 NOTE — ED PROVIDER NOTES
[address] [place] [address] ED  eMERGENCY dEPARTMENT eNCOUnter      Pt Name: Radha Schwarz  MRN: [id number]  [place] 1955  Date of evaluation: 6/25/2019  Provider: ARMANI Zapata [id number]       Chief Complaint   Patient presents with    Knee Injury     right         HISTORY OFPRESENT ILLNESS  (Location/Symptom, Timing/Onset, Context/Setting, Quality, Duration, Modifying Factors, Severity.)   Radha Schwarz is a 75 y.o. male who presents to the emergency department patient states he was walking up a step today and felt a pop in the back of his right knee. He has had pain and swelling since incident. Pain is a 10 out of 10. He states he can bear weight on his right knee. Presents to the emergency department demanding to have an MRI of his right knee. Nursing Notes were reviewed. PASTMEDICAL HISTORY     Past Medical History:   Diagnosis Date    Cardiac syncope 12    Hyperlipidemia          SURGICAL HISTORY       Past Surgical History:   Procedure Laterality Date    ANKLE SURGERY Left 1970    COLONOSCOPY  2010    INGUINAL HERNIA REPAIR Right 1987    LASIK  1999    NASAL SEPTUM SURGERY  1994    TONSILLECTOMY  1971         CURRENT MEDICATIONS     Previous Medications    ASPIRIN 81 MG TABLET    Take 81 mg by mouth daily.  Three times a week    CITALOPRAM (CELEXA) 20 MG TABLET    Take 20 mg by mouth daily     CONTINUOUS BLOOD GLUC  (FREESTYLE CASSIE 14 DAY READER) SHIRIN    1 each by Does not apply route continuous    CONTINUOUS BLOOD GLUC  (FREESTYLE CASSIE READER) SHIRIN    Test BS once daily    CONTINUOUS BLOOD GLUC SENSOR (FREESTYLE CASSIE 14 DAY SENSOR) MISC    Change every 14 days    CONTINUOUS BLOOD GLUC SENSOR (FREESTYLE CASSIE SENSOR SYSTEM) MISC    TEST ONCE DAILY    DICLOFENAC (VOLTAREN) 75 MG EC TABLET    TAKE ONE TABLET BY MOUTH TWICE A DAY    FLUDROCORTISONE (FLORINEF) 0.1 MG TABLET    Take 0.1 mg by mouth 2 times daily     METFORMIN (GLUCOPHAGE-XR) 500 MG EXTENDED RELEASE TABLET    q am pc    VOLTAREN 1 % GEL    as needed        ALLERGIES     Erythromycin    FAMILY HISTORY     History reviewed. No pertinent family history. SOCIAL HISTORY       Social History     Socioeconomic History    Marital status:      Spouse name: None    Number of children: 3    Years of education: 25    Highest education level: None   Occupational History    Occupation: yuback     Employer: [employer] [address] [employer] [address]: retired since [address]  resource strain: None    Food insecurity:     Worry: None     Inability: None    Transportation needs:     Medical: None     Non-medical: None   Tobacco Use    Smoking status: Never Smoker    Smokeless tobacco: Never Used   Substance and Sexual Activity    Alcohol use: No    Drug use: No    Sexual activity: Yes   Lifestyle    Physical activity:     Days per week: None     Minutes per session: None    Stress: None   Relationships    Social connections:     Talks on phone: None     Gets together: None     Attends [employer] service: None     Active member of club or organization: None     Attends meetings of clubs or organizations: None     Relationship status: None    Intimate partner violence:     Fear of current or ex partner: None     Emotionally abused: None     Physically abused: None     Forced sexual activity: None   Other Topics Concern    None   Social History Narrative    Diet - healthy    Caffeine intake per day - none    Exercise pattern - bikes regularyl    Living situation - house with wfe sones and dogs and cats         REVIEW OF SYSTEMS    (2-9 systems for level 4, 10 or more for level 5)     Review of Systems   Musculoskeletal: Positive for arthralgias and joint swelling. Skin: Negative for color change. All other systems reviewed and are negative. Except as noted above the remainder of the review of systems was reviewed and negative.      PHYSICAL EXAM    (up to 7 for level 4, 8 or more for level 5)     ED Triage Vitals   BP Temp Temp src Pulse Resp SpO2 Height Weight   06/25/19 1748 06/25/19 1747 -- 06/25/19 1747 06/25/19 1747 06/25/19 1747 06/25/19 1747 06/25/19 1747   135/80 98.1 °F (36.7 °C)  74 16 99 % 5' 11\" (1.803 m) 222 lb (100.7 kg)       Physical Exam   Constitutional: He is oriented to person, place, and time. He appears well-developed and well-nourished. HENT:   Head: Normocephalic and atraumatic. Right Ear: External ear normal.   Left Ear: External ear normal.   Nose: Nose normal.   Mouth/Throat: Oropharynx is clear and moist.   Eyes: Pupils are equal, round, and reactive to light. Conjunctivae and EOM are normal.   Neck: Normal range of motion. Neck supple. Cardiovascular: Intact distal pulses and normal pulses. Pulses:       Dorsalis pedis pulses are 2+ on the right side. Posterior tibial pulses are 2+ on the right side. Pulmonary/Chest: Effort normal. No respiratory distress. Musculoskeletal:        Right knee: He exhibits decreased range of motion and swelling. He exhibits no ecchymosis. Tenderness found. Legs:  Neurological: He is alert and oriented to person, place, and time. No cranial nerve deficit. Skin: Skin is warm and dry. Capillary refill takes less than 2 seconds. No ecchymosis and no rash noted. No erythema. Psychiatric: He has a normal mood and affect.  His behavior is normal. Judgment and thought content normal.         DIAGNOSTIC RESULTS     EKG:All EKG's are interpreted by the Emergency Department Physician who either signs or Co-signs this chart in the absence of a cardiologist.        RADIOLOGY:   Non-plain film images such as CT, Ultrasound and MRI are read by theradiologist. Plain radiographic images are visualized and preliminarily interpreted by the emergency physician with the below findings:        Interpretation per the Radiologist below, if available at the time of this note:    No orders to display         EDBEDSIDE ULTRASOUND:   Performed by Viviana Cosby - filemon    LABS:  [unfilled]    All other labs were within normal range or not returned as of this dictation. EMERGENCY DEPARTMENT COURSE andDIFFERENTIAL DIAGNOSIS/MDM:   Patient was evaluated in conjunction with attending physician. Patient comes to the ER demanding an MRI of his right knee. He was informed that we would not do an MRI because insurance to pay for it and he was offered an x-ray but refused. Patient eloped from the emergency department for treatment could be completed. Vitals:    Vitals:    06/25/19 1747 06/25/19 1748   BP:  135/80   Pulse: 74    Resp: 16    Temp: 98.1 °F (36.7 °C)    SpO2: 99%    Weight: 222 lb (100.7 kg)    Height: 5' 11\" (1.803 m)          CONSULTS:  None    PROCEDURES:  Procedures    FINAL IMPRESSION      1. Eloped from emergency department          DISPOSITION/PLAN   DISPOSITION Eloped - Left Before Treatment Complete 06/25/2019 06:01:14 PM      PATIENT REFERRED TO:   No follow-up provider specified.     DISCHARGE MEDICATIONS:     New Prescriptions    No medications on file     Electronically signed by ARMANI Bay 6/25/2019 at 6:04 PM          ARMANI Bay CNP  06/25/19 [id number]

## 2019-06-26 ENCOUNTER — TELEPHONE (OUTPATIENT)
Dept: FAMILY MEDICINE CLINIC | Age: 64
End: 2019-06-26

## 2019-06-26 NOTE — TELEPHONE ENCOUNTER
[facility]) ED Follow up Call    Reason for ED visit:  Jacinta Rowland      6/26/2019     Hi Lisset Ardon , this is Olinda  from Dr. Christiane Moe's office, just calling to see how you are doing after your recent ED visit. Did you receive discharge instructions? Yes  Do you understand the discharge instructions? Yes  Did the ED give you any new prescriptions? Yes  Were you able to fill your prescriptions? Yes      Do you have one of our red, yellow and green  Zone sheets that help you to determine when you should go to the ED? Not Applicable      Do you need or want to make a follow up appt with your PCP? No    Do you have any further needs in the home, e.g. equipment? No        FU appts/Provider:    Future Appointments   Date Time Provider Shaniqua Perez   9/26/2019  8:00 AM MD CORRIE Leary [facility]         VOICEMAIL DOCUMENTATION - ERASE IF NOT USED  Hi, this message is for Lisset Ardon. This is Olinda Kirsten from [address] [facility] office. Just calling to see how you are doing after your recent visit to the Emergency Room. [address] [facility] wants to make sure you were able to fill any prescriptions and that you understand your discharge instructions. Please return our call if you need to make a follow up appointment with your provider or have any further needs. Our phone number is [phone]. Have a great day.

## 2019-07-19 ENCOUNTER — NURSE TRIAGE (OUTPATIENT)
Dept: OTHER | Facility: CLINIC | Age: 64
End: 2019-07-19

## 2019-07-19 NOTE — TELEPHONE ENCOUNTER
Reason for Disposition   Patient wants to be seen    Protocols used: LEG INJURY-ADULT-[place]    Patient called pre-service center [facility]) to schedule appointment, with red flag complaint, transferred to RN access for triage. Patient reports injury to right leg with severe pain and difficulty ambulating. Patient reports he was seen by orthopedic with an MRI to confirm torn miniscus and hamstring. Patient reports orthopedic is extremely booked and does not have surgery scheduled yet. Patient reports that orthopedic did not give him any at home care for injury until surgery. Writer instructed patient to be seen in office today for severe pain and instructed patient on home care for leg injury. Writer provided warm transfer to [facility] for appointment scheduling.

## 2019-10-14 ENCOUNTER — TELEPHONE (OUTPATIENT)
Dept: FAMILY MEDICINE CLINIC | Age: 64
End: 2019-10-14

## 2019-10-17 ENCOUNTER — OFFICE VISIT (OUTPATIENT)
Dept: FAMILY MEDICINE CLINIC | Age: 64
End: 2019-10-17
Payer: MEDICARE

## 2019-10-17 VITALS
HEART RATE: 93 BPM | WEIGHT: 217.5 LBS | DIASTOLIC BLOOD PRESSURE: 80 MMHG | OXYGEN SATURATION: 98 % | SYSTOLIC BLOOD PRESSURE: 132 MMHG | BODY MASS INDEX: 30.34 KG/M2

## 2019-10-17 DIAGNOSIS — I95.1 ORTHOSTATIC HYPOTENSION: ICD-10-CM

## 2019-10-17 DIAGNOSIS — R55 SYNCOPE, UNSPECIFIED SYNCOPE TYPE: Primary | ICD-10-CM

## 2019-10-17 PROCEDURE — 4040F PNEUMOC VAC/ADMIN/RCVD: CPT | Performed by: FAMILY MEDICINE

## 2019-10-17 PROCEDURE — 3017F COLORECTAL CA SCREEN DOC REV: CPT | Performed by: FAMILY MEDICINE

## 2019-10-17 PROCEDURE — G8427 DOCREV CUR MEDS BY ELIG CLIN: HCPCS | Performed by: FAMILY MEDICINE

## 2019-10-17 PROCEDURE — 1036F TOBACCO NON-USER: CPT | Performed by: FAMILY MEDICINE

## 2019-10-17 PROCEDURE — G8417 CALC BMI ABV UP PARAM F/U: HCPCS | Performed by: FAMILY MEDICINE

## 2019-10-17 PROCEDURE — 1123F ACP DISCUSS/DSCN MKR DOCD: CPT | Performed by: FAMILY MEDICINE

## 2019-10-17 PROCEDURE — G8484 FLU IMMUNIZE NO ADMIN: HCPCS | Performed by: FAMILY MEDICINE

## 2019-10-17 PROCEDURE — 99214 OFFICE O/P EST MOD 30 MIN: CPT | Performed by: FAMILY MEDICINE

## 2019-10-17 RX ORDER — FLUDROCORTISONE ACETATE 0.1 MG/1
0.1 TABLET ORAL DAILY
Qty: 30 TABLET | Refills: 3 | Status: SHIPPED | OUTPATIENT
Start: 2019-10-17 | End: 2019-11-16

## 2019-12-27 ENCOUNTER — TELEPHONE (OUTPATIENT)
Dept: FAMILY MEDICINE CLINIC | Age: 64
End: 2019-12-27

## 2020-01-03 ENCOUNTER — APPOINTMENT (OUTPATIENT)
Dept: CT IMAGING | Age: 65
End: 2020-01-03
Payer: MEDICARE

## 2020-01-03 ENCOUNTER — HOSPITAL ENCOUNTER (OUTPATIENT)
Age: 65
Setting detail: OBSERVATION
Discharge: HOME | End: 2020-01-04
Attending: EMERGENCY MEDICINE | Admitting: INTERNAL MEDICINE
Payer: MEDICARE

## 2020-01-03 ENCOUNTER — TELEPHONE (OUTPATIENT)
Dept: FAMILY MEDICINE CLINIC | Age: 65
End: 2020-01-03

## 2020-01-03 ENCOUNTER — APPOINTMENT (OUTPATIENT)
Dept: GENERAL RADIOLOGY | Age: 65
End: 2020-01-03
Payer: MEDICARE

## 2020-01-03 PROBLEM — R55 NEUROCARDIOGENIC SYNCOPE: Status: ACTIVE | Noted: 2020-01-03

## 2020-01-03 LAB
ABSOLUTE EOS #: 0.04 K/UL (ref 0–0.44)
ABSOLUTE IMMATURE GRANULOCYTE: 0.01 K/UL (ref 0–0.3)
ABSOLUTE LYMPH #: 1.06 K/UL (ref 1.1–3.7)
ABSOLUTE MONO #: 0.75 K/UL (ref 0.1–1.2)
ANION GAP SERPL CALCULATED.3IONS-SCNC: 13 MMOL/L (ref 9–17)
BASOPHILS # BLD: 1 % (ref 0–2)
BASOPHILS ABSOLUTE: 0.04 K/UL (ref 0–0.2)
BNP INTERPRETATION: NORMAL
BNP INTERPRETATION: NORMAL
BUN BLDV-MCNC: 20 MG/DL (ref 8–23)
BUN/CREAT BLD: 13 (ref 9–20)
CALCIUM SERPL-MCNC: 9 MG/DL (ref 8.6–10.4)
CHLORIDE BLD-SCNC: 104 MMOL/L (ref 98–107)
CO2: 21 MMOL/L (ref 20–31)
CREAT SERPL-MCNC: 1.52 MG/DL (ref 0.7–1.2)
DIFFERENTIAL TYPE: ABNORMAL
EOSINOPHILS RELATIVE PERCENT: 1 % (ref 1–4)
GFR AFRICAN AMERICAN: 56 ML/MIN
GFR NON-AFRICAN AMERICAN: 46 ML/MIN
GFR SERPL CREATININE-BSD FRML MDRD: ABNORMAL ML/MIN/{1.73_M2}
GFR SERPL CREATININE-BSD FRML MDRD: ABNORMAL ML/MIN/{1.73_M2}
GLUCOSE BLD-MCNC: 143 MG/DL (ref 70–99)
HCT VFR BLD CALC: 46.8 % (ref 40.7–50.3)
HEMOGLOBIN: 15.3 G/DL (ref 13–17)
IMMATURE GRANULOCYTES: 0 %
INR BLD: 1
LACTIC ACID: 1.1 MMOL/L (ref 0.5–2.2)
LV EF: 55 %
LVEF MODALITY: NORMAL
LYMPHOCYTES # BLD: 27 % (ref 24–43)
MCH RBC QN AUTO: 31 PG (ref 25.2–33.5)
MCHC RBC AUTO-ENTMCNC: 32.7 G/DL (ref 28.4–34.8)
MCV RBC AUTO: 94.9 FL (ref 82.6–102.9)
MONOCYTES # BLD: 19 % (ref 3–12)
NRBC AUTOMATED: 0 PER 100 WBC
PARTIAL THROMBOPLASTIN TIME: 27.5 SEC (ref 23–31)
PDW BLD-RTO: 12.3 % (ref 11.8–14.4)
PLATELET # BLD: 121 K/UL (ref 138–453)
PLATELET ESTIMATE: ABNORMAL
PMV BLD AUTO: 10.1 FL (ref 8.1–13.5)
POTASSIUM SERPL-SCNC: 4.3 MMOL/L (ref 3.7–5.3)
PRO-BNP: 23 PG/ML
PRO-BNP: 27 PG/ML
PROTHROMBIN TIME: 10.6 SEC (ref 9.7–11.6)
RBC # BLD: 4.93 M/UL (ref 4.21–5.77)
RBC # BLD: ABNORMAL 10*6/UL
SEG NEUTROPHILS: 52 % (ref 36–65)
SEGMENTED NEUTROPHILS ABSOLUTE COUNT: 2.02 K/UL (ref 1.5–8.1)
SODIUM BLD-SCNC: 138 MMOL/L (ref 135–144)
TOTAL CK: 209 U/L (ref 39–308)
TROPONIN INTERP: NORMAL
TROPONIN T: NORMAL NG/ML
TROPONIN, HIGH SENSITIVITY: 7 NG/L (ref 0–22)
TROPONIN, HIGH SENSITIVITY: 7 NG/L (ref 0–22)
TROPONIN, HIGH SENSITIVITY: 8 NG/L (ref 0–22)
WBC # BLD: 3.9 K/UL (ref 3.5–11.3)
WBC # BLD: ABNORMAL 10*3/UL

## 2020-01-03 PROCEDURE — 83605 ASSAY OF LACTIC ACID: CPT

## 2020-01-03 PROCEDURE — 99285 EMERGENCY DEPT VISIT HI MDM: CPT

## 2020-01-03 PROCEDURE — 83880 ASSAY OF NATRIURETIC PEPTIDE: CPT

## 2020-01-03 PROCEDURE — 6370000000 HC RX 637 (ALT 250 FOR IP): Performed by: NURSE PRACTITIONER

## 2020-01-03 PROCEDURE — 80048 BASIC METABOLIC PNL TOTAL CA: CPT

## 2020-01-03 PROCEDURE — 2580000003 HC RX 258: Performed by: NURSE PRACTITIONER

## 2020-01-03 PROCEDURE — 70450 CT HEAD/BRAIN W/O DYE: CPT

## 2020-01-03 PROCEDURE — 85610 PROTHROMBIN TIME: CPT

## 2020-01-03 PROCEDURE — G0378 HOSPITAL OBSERVATION PER HR: HCPCS

## 2020-01-03 PROCEDURE — 84484 ASSAY OF TROPONIN QUANT: CPT

## 2020-01-03 PROCEDURE — 85025 COMPLETE CBC W/AUTO DIFF WBC: CPT

## 2020-01-03 PROCEDURE — 99220 PR INITIAL OBSERVATION CARE/DAY 70 MINUTES: CPT | Performed by: NURSE PRACTITIONER

## 2020-01-03 PROCEDURE — 82550 ASSAY OF CK (CPK): CPT

## 2020-01-03 PROCEDURE — 36415 COLL VENOUS BLD VENIPUNCTURE: CPT

## 2020-01-03 PROCEDURE — 93306 TTE W/DOPPLER COMPLETE: CPT

## 2020-01-03 PROCEDURE — 85730 THROMBOPLASTIN TIME PARTIAL: CPT

## 2020-01-03 PROCEDURE — 93005 ELECTROCARDIOGRAM TRACING: CPT | Performed by: EMERGENCY MEDICINE

## 2020-01-03 PROCEDURE — 71045 X-RAY EXAM CHEST 1 VIEW: CPT

## 2020-01-03 RX ORDER — ACETAMINOPHEN 325 MG/1
650 TABLET ORAL EVERY 4 HOURS PRN
Status: DISCONTINUED | OUTPATIENT
Start: 2020-01-03 | End: 2020-01-04 | Stop reason: HOSPADM

## 2020-01-03 RX ORDER — SODIUM CHLORIDE 9 MG/ML
INJECTION, SOLUTION INTRAVENOUS CONTINUOUS
Status: DISCONTINUED | OUTPATIENT
Start: 2020-01-03 | End: 2020-01-04 | Stop reason: HOSPADM

## 2020-01-03 RX ORDER — NICOTINE 21 MG/24HR
1 PATCH, TRANSDERMAL 24 HOURS TRANSDERMAL DAILY PRN
Status: DISCONTINUED | OUTPATIENT
Start: 2020-01-03 | End: 2020-01-04 | Stop reason: HOSPADM

## 2020-01-03 RX ORDER — DICLOFENAC SODIUM 75 MG/1
75 TABLET, DELAYED RELEASE ORAL 2 TIMES DAILY PRN
Status: ON HOLD | COMMUNITY
End: 2020-01-04 | Stop reason: HOSPADM

## 2020-01-03 RX ORDER — ONDANSETRON 2 MG/ML
4 INJECTION INTRAMUSCULAR; INTRAVENOUS EVERY 6 HOURS PRN
Status: DISCONTINUED | OUTPATIENT
Start: 2020-01-03 | End: 2020-01-04 | Stop reason: HOSPADM

## 2020-01-03 RX ORDER — METFORMIN HYDROCHLORIDE 500 MG/1
500 TABLET, EXTENDED RELEASE ORAL
COMMUNITY
End: 2020-07-06

## 2020-01-03 RX ORDER — ASPIRIN 81 MG/1
81 TABLET ORAL
Status: DISCONTINUED | OUTPATIENT
Start: 2020-01-03 | End: 2020-01-04 | Stop reason: HOSPADM

## 2020-01-03 RX ORDER — POTASSIUM CHLORIDE 20 MEQ/1
40 TABLET, EXTENDED RELEASE ORAL PRN
Status: DISCONTINUED | OUTPATIENT
Start: 2020-01-03 | End: 2020-01-04 | Stop reason: HOSPADM

## 2020-01-03 RX ORDER — SODIUM CHLORIDE 0.9 % (FLUSH) 0.9 %
10 SYRINGE (ML) INJECTION EVERY 12 HOURS SCHEDULED
Status: DISCONTINUED | OUTPATIENT
Start: 2020-01-03 | End: 2020-01-04 | Stop reason: HOSPADM

## 2020-01-03 RX ORDER — ONDANSETRON 2 MG/ML
4 INJECTION INTRAMUSCULAR; INTRAVENOUS EVERY 6 HOURS PRN
Status: DISCONTINUED | OUTPATIENT
Start: 2020-01-03 | End: 2020-01-03 | Stop reason: SDUPTHER

## 2020-01-03 RX ORDER — CITALOPRAM 20 MG/1
20 TABLET ORAL DAILY
Status: DISCONTINUED | OUTPATIENT
Start: 2020-01-03 | End: 2020-01-04 | Stop reason: HOSPADM

## 2020-01-03 RX ORDER — SODIUM CHLORIDE 0.9 % (FLUSH) 0.9 %
10 SYRINGE (ML) INJECTION PRN
Status: DISCONTINUED | OUTPATIENT
Start: 2020-01-03 | End: 2020-01-04 | Stop reason: HOSPADM

## 2020-01-03 RX ORDER — ONDANSETRON 4 MG/1
4 TABLET, ORALLY DISINTEGRATING ORAL EVERY 6 HOURS PRN
Status: DISCONTINUED | OUTPATIENT
Start: 2020-01-03 | End: 2020-01-04 | Stop reason: HOSPADM

## 2020-01-03 RX ORDER — MAGNESIUM SULFATE 1 G/100ML
1 INJECTION INTRAVENOUS PRN
Status: DISCONTINUED | OUTPATIENT
Start: 2020-01-03 | End: 2020-01-04 | Stop reason: HOSPADM

## 2020-01-03 RX ORDER — POTASSIUM CHLORIDE 7.45 MG/ML
10 INJECTION INTRAVENOUS PRN
Status: DISCONTINUED | OUTPATIENT
Start: 2020-01-03 | End: 2020-01-04 | Stop reason: HOSPADM

## 2020-01-03 RX ADMIN — SODIUM CHLORIDE: 9 INJECTION, SOLUTION INTRAVENOUS at 21:08

## 2020-01-03 RX ADMIN — SODIUM CHLORIDE: 9 INJECTION, SOLUTION INTRAVENOUS at 12:13

## 2020-01-03 RX ADMIN — CITALOPRAM HYDROBROMIDE 20 MG: 20 TABLET ORAL at 13:28

## 2020-01-03 RX ADMIN — ASPIRIN 81 MG: 81 TABLET, COATED ORAL at 13:28

## 2020-01-03 ASSESSMENT — ENCOUNTER SYMPTOMS
CHEST TIGHTNESS: 0
ABDOMINAL PAIN: 0
ABDOMINAL DISTENTION: 0
EYE DISCHARGE: 0
BACK PAIN: 0
EYE PAIN: 0
SHORTNESS OF BREATH: 0
FACIAL SWELLING: 0

## 2020-01-03 NOTE — TELEPHONE ENCOUNTER
Pts wife was seen this am and pt passed out while here. She called stating that her son is a physician and is having a fit because Lana Perez needs to have Telemetry and echo before he leaves the ER but the ER doc said he wont do it without an order form his PCP. Also needs an over night telemetry monitoring. Please advise.

## 2020-01-03 NOTE — TELEPHONE ENCOUNTER
I am not his pcp, I can not order what an ER physician should do, I did not see this pt this morning.

## 2020-01-03 NOTE — ED PROVIDER NOTES
Platelets 239 (*)     Monocytes 19 (*)     Absolute Lymph # 1.06 (*)     All other components within normal limits   BASIC METABOLIC PANEL W/ REFLEX TO MG FOR LOW K - Abnormal; Notable for the following components:    Glucose 143 (*)     CREATININE 1.52 (*)     GFR Non- 46 (*)     GFR  56 (*)     All other components within normal limits   TROPONIN   BRAIN NATRIURETIC PEPTIDE   PROTIME-INR   APTT   CK   LACTIC ACID   TROPONIN   TROPONIN   BRAIN NATRIURETIC PEPTIDE   BASIC METABOLIC PANEL W/ REFLEX TO MG FOR LOW K   MAGNESIUM   CBC   POCT GLUCOSE       EMERGENCY DEPARTMENTCOURSE:         Vitals:    Vitals:    01/03/20 1201 01/03/20 1516 01/03/20 1518 01/03/20 1520   BP: 139/75 (!) 142/82 138/82 (!) 141/80   Pulse: 68 75 82 92   Resp: 16 16     Temp: 97.9 °F (36.6 °C) 98.2 °F (36.8 °C)     TempSrc: Oral Oral     SpO2: 97% 98%     Weight:       Height:           The patient was given the following medications while in the emergency department:  Orders Placed This Encounter   Medications    citalopram (CELEXA) tablet 20 mg    aspirin EC tablet 81 mg    0.9 % sodium chloride infusion    sodium chloride flush 0.9 % injection 10 mL    sodium chloride flush 0.9 % injection 10 mL    OR Linked Order Group     potassium chloride (KLOR-CON M) extended release tablet 40 mEq     potassium bicarb-citric acid (EFFER-K) effervescent tablet 40 mEq     potassium chloride 10 mEq/100 mL IVPB (Peripheral Line)    magnesium sulfate 1 g in dextrose 5% 100 mL IVPB    magnesium hydroxide (MILK OF MAGNESIA) 400 MG/5ML suspension 30 mL    DISCONTD: ondansetron (ZOFRAN) injection 4 mg    nicotine (NICODERM CQ) 21 MG/24HR 1 patch    enoxaparin (LOVENOX) injection 40 mg    acetaminophen (TYLENOL) tablet 650 mg    OR Linked Order Group     ondansetron (ZOFRAN-ODT) disintegrating tablet 4 mg     ondansetron (ZOFRAN) injection 4 mg     CONSULTS:  IP CONSULT TO INTERNAL MEDICINE  IP CONSULT TO SOCIAL

## 2020-01-03 NOTE — PROGRESS NOTES
Walked into room to find patient returning to bed after going to [facility], visitor had turned off bed alarm , pt again instructed to please call for staff help as he has lost consciousness twice today.

## 2020-01-03 NOTE — CONSULTS
Reason for Consult:  Syncope  Requesting Physician: Dominik Montano MD    CHIEF COMPLAINT:  Syncope    History Obtained From:  patient, electronic medical record    HISTORY OF PRESENT ILLNESS:      The patient is a 75 y.o. male with significant past medical history of neurocardiogenic syncope, diabetes, and hyperlipidemia that is a known patient to Dr. Sharron Brice who presents with syncope. The patient was at his wife's office appointment and reports he was sitting listening to a graphic description of the ear canal and had a syncopal episode. He was then lowered to the ground and then stood back up and had another syncopal episode. He denies any associated palpitations, seizure-like activity, chest pain, or shortness of breath. He is normally very active and plays pickle ball without any chest pain or shortness of breath. He does not feel he was dehydrated recently but he has had recurrence of his syncopal episodes since last October and was thought to be dehydrated at that time. His Celexa was increased after his recurrent syncopal episodes. He was previously treated successfully with Florinef that had been weaned off about 3-1/2 years ago. He denies having any recent cardiac testing. Past Medical History:    Past Medical History:   Diagnosis Date    Cardiac syncope 1994    Diabetes mellitus ([address]     Hyperlipidemia      Past Surgical History:    Past Surgical History:   Procedure Laterality Date    ANKLE SURGERY Left 1970    COLONOSCOPY  2010    INGUINAL HERNIA REPAIR Right 1987    LASIK  1999    NASAL SEPTUM SURGERY  1994    TONSILLECTOMY  1971     Home Medications:  Prior to Admission medications    Medication Sig Start Date End Date Taking?  Authorizing Provider   metFORMIN (GLUCOPHAGE-XR) 500 MG extended release tablet Take 500 mg by mouth daily (with breakfast)   Yes Historical Provider, MD   citalopram (CELEXA) 20 MG tablet Take 20 mg by mouth daily  7/31/14  Yes Historical Provider, MD   VOLTAREN 1 % GEL Apply 2 g topically 4 times daily as needed for Pain (knee)  7/31/14  Yes Historical Provider, MD   aspirin 81 MG tablet Take 81 mg by mouth three times a week Indications:  Mon/Wed/Fri Mon/Wed/Fri   Yes Historical Provider, MD   diclofenac (VOLTAREN) 75 MG EC tablet Take 75 mg by mouth 2 times daily as needed for Pain    Historical Provider, MD   Continuous Blood Gluc  (FREESTYLE CASSIE 14 DAY READER) SHIRIN 1 each by Does not apply route continuous 3/11/19   Genna Cox MD   Continuous Blood Gluc Sensor (FREESTYLE CASSIE 14 DAY SENSOR) MISC Change every 14 days 3/11/19   Genna Cox MD   Continuous Blood Gluc Sensor ([address] [place] [address]) [address] [facility] TEST ONCE DAILY 1/28/19   Genna Cox MD   Continuous Blood Gluc  (FREESTYLE CASSIE READER) SHIRIN Test BS once daily 9/10/18   Genna Cox MD     Current Medications:    Current Facility-Administered Medications   Medication Dose Route Frequency Provider Last Rate Last Dose    citalopram (CELEXA) tablet 20 mg  20 mg Oral Daily Olinda Bobbi, APRN - NP        aspirin EC tablet 81 mg  81 mg Oral Once per day on Mon Wed Fri ARMANI Sims - NP        0.9 % sodium chloride infusion   Intravenous Continuous Olinda Bobbi, APRN -  mL/hr at 01/03/20 1213      sodium chloride flush 0.9 % injection 10 mL  10 mL Intravenous 2 times per day Bev Hopkins APRN - NP        sodium chloride flush 0.9 % injection 10 mL  10 mL Intravenous PRN Olinda Bobbi, APRN - NP        potassium chloride (KLOR-CON M) extended release tablet 40 mEq  40 mEq Oral PRN Olinda Bobbi, APRN - NP        Or    potassium bicarb-citric acid (EFFER-K) effervescent tablet 40 mEq  40 mEq Oral PRN Olinda Bobbi, APRN - NP        Or    potassium chloride 10 mEq/100 mL IVPB (Peripheral Line)  10 mEq Intravenous PRN Olinda Bobbi, APRN - NP        magnesium sulfate 1 g in dextrose 5% 100 mL IVPB  1 g Intravenous PRN ECHO: pending      Cardiology Labs:  Recent Labs     01/03/20  0924 01/03/20  1215   CKTOTAL 209  --    TROPHS 7 8   TROPONINT NOT REPORTED NOT REPORTED     Warfarin PT/INR:  Lab Results   Component Value Date    PROTIME 10.6 01/03/2020    INR 1.0 01/03/2020     CBC:  Lab Results   Component Value Date    WBC 3.9 01/03/2020    RBC 4.93 01/03/2020    HGB 15.3 01/03/2020    HCT 46.8 01/03/2020    MCV 94.9 01/03/2020    MCH 31.0 01/03/2020    MCHC 32.7 01/03/2020    RDW 12.3 01/03/2020     01/03/2020    MPV 10.1 01/03/2020     CMP:  Lab Results   Component Value Date     01/03/2020    K 4.3 01/03/2020     01/03/2020    CO2 21 01/03/2020    BUN 20 01/03/2020    CREATININE 1.52 01/03/2020    GFRAA 56 01/03/2020    LABGLOM 46 01/03/2020    GLUCOSE 143 01/03/2020    CALCIUM 9.0 01/03/2020     Magnesium:  No results found for: MG  PTT:    Lab Results   Component Value Date    APTT 27.5 01/03/2020     TSH:    Lab Results   Component Value Date    TSH 8.150 12/27/2011     BNP:   Recent Labs     01/03/20  0924 01/03/20  1215   PROBNP 23 27     BMP:  Lab Results   Component Value Date     01/03/2020    K 4.3 01/03/2020     01/03/2020    CO2 21 01/03/2020    BUN 20 01/03/2020    LABALBU 4.5 02/05/2019    CREATININE 1.52 01/03/2020    CALCIUM 9.0 01/03/2020    GFRAA 56 01/03/2020    LABGLOM 46 01/03/2020    GLUCOSE 143 01/03/2020     LIVER PROFILE:No results for input(s): AST, ALT, LABALBU, ALKPHOS, BILITOT, BILIDIR, IBILI, PROT, GLOB, ALBUMIN in the last 72 hours. FLP:    Lab Results   Component Value Date    CHOL 178 02/05/2019    CHOL 189 01/05/2015    TRIG 52 02/05/2019    HDL 51 02/05/2019    LDLCHOLESTEROL 117 02/05/2019     IMPRESSION    · Recurrent syncope, history of neurocardiogenic syncope for years and was successfully treated with Celexa and Florinef. His Florinef was weaned off about 3-1/2 years ago and he normally follows with Dr. Garrison Buchanan.   · Diabetes, managed by others  · Renal insufficiency, managed by others  · Hyperlipidemia, managed by others  · No clinical angina or signs of acute MI    RECOMMENDATIONS:     Continue current cardiac medications. Check orthostatic blood pressure. Compression stockings when upright. Await echocardiogram results if no significant abnormality will consider restarting low-dose Florinef. The patient reports he was successfully treated with Florinef and Celexa and his Florinef was weaned off about 3-1/2 years ago. He has had recurrent syncope since October. Continue to monitor rhythm. Management plan was discussed with patient, his wife and son at the bedside, and Dr. Janiec Severino. Thank you for the consultation.     Electronically signed by ARMANI Joseph CNP on 1/3/2020 at 1:26 PM     CC: Kimo Gross MD

## 2020-01-04 VITALS
RESPIRATION RATE: 16 BRPM | TEMPERATURE: 98.3 F | BODY MASS INDEX: 28.98 KG/M2 | HEART RATE: 71 BPM | SYSTOLIC BLOOD PRESSURE: 134 MMHG | HEIGHT: 71 IN | WEIGHT: 207 LBS | OXYGEN SATURATION: 99 % | DIASTOLIC BLOOD PRESSURE: 83 MMHG

## 2020-01-04 LAB
EKG ATRIAL RATE: 65 BPM
EKG P AXIS: 63 DEGREES
EKG P-R INTERVAL: 158 MS
EKG Q-T INTERVAL: 394 MS
EKG QRS DURATION: 90 MS
EKG QTC CALCULATION (BAZETT): 409 MS
EKG R AXIS: 22 DEGREES
EKG T AXIS: 26 DEGREES
EKG VENTRICULAR RATE: 65 BPM

## 2020-01-04 PROCEDURE — G0378 HOSPITAL OBSERVATION PER HR: HCPCS

## 2020-01-04 PROCEDURE — 2580000003 HC RX 258: Performed by: NURSE PRACTITIONER

## 2020-01-04 PROCEDURE — 99217 PR OBSERVATION CARE DISCHARGE MANAGEMENT: CPT | Performed by: FAMILY MEDICINE

## 2020-01-04 PROCEDURE — 6370000000 HC RX 637 (ALT 250 FOR IP): Performed by: NURSE PRACTITIONER

## 2020-01-04 RX ORDER — FLUDROCORTISONE ACETATE 0.1 MG/1
0.1 TABLET ORAL DAILY
Qty: 30 TABLET | Refills: 0
Start: 2020-01-04

## 2020-01-04 RX ORDER — FLUDROCORTISONE ACETATE 0.1 MG/1
0.1 TABLET ORAL DAILY
Qty: 30 TABLET | Refills: 0
Start: 2020-01-04 | End: 2020-02-03

## 2020-01-04 RX ADMIN — CITALOPRAM HYDROBROMIDE 20 MG: 20 TABLET ORAL at 07:45

## 2020-01-04 RX ADMIN — SODIUM CHLORIDE: 9 INJECTION, SOLUTION INTRAVENOUS at 07:19

## 2020-01-04 ASSESSMENT — ENCOUNTER SYMPTOMS
CHEST TIGHTNESS: 0
NAUSEA: 0
COUGH: 0
ABDOMINAL PAIN: 0
VOMITING: 0
CONSTIPATION: 0
DIARRHEA: 0
WHEEZING: 0
SHORTNESS OF BREATH: 0
COLOR CHANGE: 0
BLOOD IN STOOL: 0

## 2020-01-04 ASSESSMENT — PAIN SCALES - GENERAL: PAINLEVEL_OUTOF10: 0

## 2020-01-04 NOTE — PROGRESS NOTES
[facility]    Progress Note    1/4/2020    1:29 PM    Name:   Tracy Espinal  MRN:     [id number]     [place]:      [de-identified]   Room:   2009/2009-02  IP Day:  0  Admit Date:  1/3/2020  9:11 AM    PCP:   Karri Amin MD  Code Status:  Full Code    Subjective:     C/C:   Chief Complaint   Patient presents with    Loss of Consciousness     Interval History Status: improved. Seen and examined during rounds. Laying in bed, NAD. Denies any further episodes of LOC or dizziness. Ambulating without difficulty. Wanting to go home. Awaiting further reccs from cardiology. No urinary issues. Reports prior usage of NSAIDs. Brief History:   (HPI from Admitting H&P):  Yessica Isbell is a 75 y.o. Non-/non  male who presents with Loss of Consciousness   and is admitted to the hospital for the management of Neurocardiogenic syncope. The patient reports he was with his wife at a doctor's appointment when he was sitting in a chair and became lightheaded, he subsequently lost consciousness for approximately 30 seconds to 1 minute. EMS was called to evaluate him. He had a second syncopal episode when they were performing orthostatic BP and pulse, he was lowered to the floor by EMS and subsequently brought into the ER. The patient endorses a history of neurocardiogenic syncope, and states his last syncopal episode was last October while he was at the gym. He denies headache blurred vision or shortness of breath.  He denies hitting his head or injury.     He follows outpatient with Dr. Arthur Johnson with neuro-cardiology.      Creatinine 1.52, high sensitivity troponin 7 & 8 respectively.     CXR unremarkable.      EKG indicates NSR with a ventricular rate of 65, no overt ST abnormalities.      CT head without contrast negative for intracranial abnormality.     Echocardiogram shows mild left ventricular hypertrophy, EF of 55%, grade 1 left 01/03/20  0924 01/03/20  1215 01/03/20  1554     --   --    K 4.3  --   --      --   --    CO2 21  --   --    GLUCOSE 143*  --   --    BUN 20  --   --    CREATININE 1.52*  --   --    ANIONGAP 13  --   --    LABGLOM 46*  --   --    GFRAA 56*  --   --    CALCIUM 9.0  --   --    PROBNP 23 27  --    TROPHS 7 8 7   CKTOTAL 209  --   --    No results for input(s): PROT, LABALBU, LABA1C, Q6PVIIK, T4WNHZI, FT4, TSH, AST, ALT, LDH, GGT, ALKPHOS, LABGGT, BILITOT, BILIDIR, AMMONIA, AMYLASE, LIPASE, LACTATE, CHOL, HDL, LDLCHOLESTEROL, CHOLHDLRATIO, TRIG, VLDL, LJD34UW, PHENYTOIN, PHENYF, URICACID, POCGLU in the last 72 hours. ABG:No results found for: POCPH, PHART, PH, POCPCO2, PSY3COV, PCO2, POCPO2, PO2ART, PO2, POCHCO3, VAW7GWX, HCO3, NBEA, PBEA, BEART, BE, THGBART, THB, EVS3JLD, YUCC5YFH, C3FJZRDU, O2SAT, FIO2  Lab Results   Component Value Date/Time    SPECIAL NOT REPORTED 09/15/2016 08:14 AM     Lab Results   Component Value Date/Time    CULTURE NO GROWTH 09/15/2016 08:14 AM    CULTURE  09/15/2016 08:14 AM     Performed at [address] [place] [address] [phone]       Radiology:  Ct Head Wo Contrast    Result Date: 1/3/2020  No acute intracranial process identified. Xr Chest Portable    Result Date: 1/3/2020  No acute process. Physical Examination:        Physical Exam  Constitutional:       Appearance: Normal appearance. HENT:      Head: Normocephalic and atraumatic. Neck:      Musculoskeletal: Neck supple. Cardiovascular:      Rate and Rhythm: Normal rate and regular rhythm. Heart sounds: Normal heart sounds. Pulmonary:      Effort: Pulmonary effort is normal. No respiratory distress. Breath sounds: Normal breath sounds. No wheezing. Abdominal:      General: Bowel sounds are normal. There is no distension. Palpations: Abdomen is soft. Tenderness: There is no tenderness. Musculoskeletal:      Right lower leg: No edema.       Left lower

## 2020-01-04 NOTE — PROGRESS NOTES
To patient's room for assessment. Patient wants to use bathroom, denies any dizziness at this time. Patient reassures nurse that he has \"signs\" before passing out. Patient states he has \"little spots\" before eyes and feels a bit nauseous. Pt says he has warning signs and able to find somewhere to sit, and if driving patient says he has time to pullover. Patient is out of bed doing leg exercises, denies any issues. Patient brushed teeth. Patient assisted back to bed, naomie hose removed, and epcs applied.

## 2020-01-04 NOTE — DISCHARGE SUMMARY
[facility]    Discharge Summary     Patient ID: Lesley Wall  :  1955   MRN: [id number]     ACCOUNT:  [de-identified]   Patient's PCP: Jayla Ramirez MD  Admit Date: 1/3/2020   Discharge Date: 2020     Length of Stay: 0  Code Status:  Full Code  Admitting Physician: Maurizio Arshad DO  Discharge Physician: Ting Vega MD     Active Discharge Diagnoses:     Hospital Problem Lists:  Principal Problem:    Neurocardiogenic syncope  Active Problems:    Type 2 diabetes mellitus without complication, without long-term current use of insulin ([facility])    Grade I diastolic dysfunction    Acute kidney injury ([facility] [address]    Syncope and collapse  Resolved Problems:    * No resolved hospital problems. *      Admission Condition:  fair     Discharged Condition: good    Hospital Stay:     Hospital Course:  Lesley Wall is a 75 y.o. male who was admitted for the management of  Neurocardiogenic syncope , presented to ER with Loss of Consciousness  Known history of neurocardiogenic syncope and follows with Dr. Armond Ruiz. Reports being weaned off florinef and has been off for about 3+ years. Patient had a repeat 2D Echo done. Echocardiogram 1/3/2020  · Left ventricle is normal in size. Mild left ventricular hypertrophy. · Global left ventricular systolic function is normal with an estimated ejection fraction of 55%. No obvious wall motion abnormality seen. · Grade I (mild) left ventricular diastolic dysfunction. · Thickened mitral valve leaflets. Mild mitral regurgitation. · Mild tricuspid regurgitation. Estimated right ventricular systolic pressure is 32 mmHg. · No significant pericardial effusion is seen. · Aortic root dimension is at upper limit of normal.    Orthostatics were done and normal. Cardiology resumed fludrocortisone and advised that patient follow up with primary cardiologist to assess further treatment duration.     Patient reports his as possible for a visit in 1 week  neurocardiogenic syncope and reassess need for florinef/long term planning per primary cardiologist       Requiring Further Evaluation/Follow Up POST HOSPITALIZATION/Incidental Findings:     Diet: regular diet    Activity: As tolerated    Instructions to Patient: Advised to avoid NSAID use and must follow up with PCP to reassess renal function and cardiology to assess treatment/duration of therapy of florinef. Discharge Medications:      Medication List      START taking these medications    fludrocortisone 0.1 MG tablet  Commonly known as:  FLORINEF  Take 1 tablet by mouth daily        CHANGE how you take these medications    FREESTYLE CASSIE [address]  1 each by Does not apply route continuous  What changed:  Another medication with the same name was removed. Continue taking this medication, and follow the directions you see here. FREESTYLE CASSIE 14 DAY SENSOR Misc  Change every 14 days  What changed:  Another medication with the same name was removed. Continue taking this medication, and follow the directions you see here. CONTINUE taking these medications    aspirin 81 MG tablet     citalopram 20 MG tablet  Commonly known as:  CELEXA     metFORMIN 500 MG extended release tablet  Commonly known as:  GLUCOPHAGE-XR     VOLTAREN 1 % Gel  Generic drug:  diclofenac sodium        STOP taking these medications    diclofenac 75 MG EC tablet  Commonly known as:  VOLTAREN           Where to Get Your Medications      Information about where to get these medications is not yet available    Ask your nurse or doctor about these medications  · fludrocortisone 0.1 MG tablet         Time Spent on discharge is  31 mins in patient examination, evaluation, counseling as well as medication reconciliation, prescriptions for required medications, discharge plan and follow up.     Electronically signed by   Agata Siegel MD  1/4/2020  1:54 PM      Thank you Dr. Rodger Jansen, MD for the opportunity to be involved in this patient's care.

## 2020-01-04 NOTE — PROGRESS NOTES
distress  HEAD:  Head is atraumatic, normocephalic. Eyes and oral mucosa are normal.  LUNGS:  clear to auscultation bilaterally  CARDIOVASCULAR: regular rhythm, normal S1 and S2, and no murmur or rub noted. ABDOMEN:  Soft, nontender, nondistended. EXTREMITIES: No lower extremity edema. NEUROLOGIC:  alert, oriented x3, affect appropriate and no focal neurological deficits    Labs:  CBC:  Recent Labs     01/03/20 0924   WBC 3.9   HGB 15.3   HCT 46.8   *     BMP:  Recent Labs     01/03/20 0924      K 4.3   CALCIUM 9.0   CO2 21   BUN 20   CREATININE 1.52*   LABGLOM 46*   GLUCOSE 143*     PT/INR:  Recent Labs     01/03/20 0924   PROTIME 10.6   INR 1.0     APTT:  Recent Labs     01/03/20 0924   APTT 27.5     CARDIAC ENZYMES:  Recent Labs     01/03/20  0924 01/03/20  1215 01/03/20  1554   CKTOTAL 209  --   --    TROPHS 7 8 7     FASTING LIPID PANEL:  Lab Results   Component Value Date    HDL 51 02/05/2019    LDLCALC 122 01/05/2015    TRIG 52 02/05/2019     LIVER PROFILE:No results for input(s): AST, ALT, LABALBU, ALKPHOS, BILITOT, BILIDIR, IBILI, PROT, GLOB, ALBUMIN in the last 72 hours. Echocardiogram 1/3/2020  · Left ventricle is normal in size. Mild left ventricular hypertrophy. · Global left ventricular systolic function is normal with an estimated ejection fraction of 55%. No obvious wall motion abnormality seen. · Grade I (mild) left ventricular diastolic dysfunction. · Thickened mitral valve leaflets. Mild mitral regurgitation. · Mild tricuspid regurgitation. Estimated right ventricular systolic pressure is 32 mmHg. · No significant pericardial effusion is seen.   · Aortic root dimension is at upper limit of normal.    Current Meds:    citalopram  20 mg Oral Daily    aspirin  81 mg Oral Once per day on Mon Wed Fri    sodium chloride flush  10 mL Intravenous 2 times per day     Continuous Infusions:    sodium chloride 100 mL/hr at 01/04/20 9964       Electronically signed Ricardo Williamson

## 2020-01-04 NOTE — H&P
Past Surgical History:   Procedure Laterality Date    ANKLE SURGERY Left 1970    COLONOSCOPY  2010    INGUINAL HERNIA REPAIR Right 1987    LASIK  1999    NASAL SEPTUM SURGERY  1994    TONSILLECTOMY  1971        Medications Prior to Admission:     Prior to Admission medications    Medication Sig Start Date End Date Taking? Authorizing Provider   metFORMIN (GLUCOPHAGE-XR) 500 MG extended release tablet Take 500 mg by mouth daily (with breakfast)   Yes Historical Provider, MD   citalopram (CELEXA) 20 MG tablet Take 20 mg by mouth daily  7/31/14  Yes Historical Provider, MD   VOLTAREN 1 % GEL Apply 2 g topically 4 times daily as needed for Pain (knee)  7/31/14  Yes Historical Provider, MD   aspirin 81 MG tablet Take 81 mg by mouth three times a week Indications: Mon/Wed/Fri Mon/Wed/Fri   Yes Historical Provider, MD   diclofenac (VOLTAREN) 75 MG EC tablet Take 75 mg by mouth 2 times daily as needed for Pain    Historical Provider, MD   Continuous Blood Gluc  (FREESTYLE CASSIE 14 DAY READER) SHIRIN 1 each by Does not apply route continuous 3/11/19   Prema Carrero MD   Continuous Blood Gluc Sensor (FREESTYLE CASSIE 14 DAY SENSOR) MISC Change every 14 days 3/11/19   Prema Carrero MD   Continuous Blood Gluc Sensor ([address] [facility]) [address] [facility] TEST ONCE DAILY 1/28/19   Prema Carrero MD   Continuous Blood Gluc  (FREESTYLE CASSIE READER) SHIRIN Test BS once daily 9/10/18   Prema Carrero MD        Allergies:     Erythromycin    Social History:     Tobacco:    reports that he has never smoked. He has never used smokeless tobacco.  Alcohol:      reports no history of alcohol use. Drug Use:  reports no history of drug use. Family History:     History reviewed. No pertinent family history. Review of Systems:     Positive and Negative as described in HPI. Review of Systems   Constitutional: Negative for chills, diaphoresis and fever. HENT: Negative for congestion.     Eyes: Negative for visual disturbance. Respiratory: Negative for cough, chest tightness, shortness of breath and wheezing. Cardiovascular: Negative for chest pain, palpitations and leg swelling. Gastrointestinal: Negative for abdominal pain, blood in stool, constipation, diarrhea, nausea and vomiting. Endocrine: Negative for cold intolerance and heat intolerance. Genitourinary: Negative for difficulty urinating, dysuria, frequency and urgency. Musculoskeletal: Negative for arthralgias and myalgias. Skin: Negative for color change and rash. Neurological: Positive for syncope and light-headedness. Negative for weakness, numbness and headaches. Hematological: Does not bruise/bleed easily. Psychiatric/Behavioral: The patient is not nervous/anxious. All other systems reviewed and are negative. Physical Exam:   BP (!) 141/78   Pulse 76   Temp 97.9 °F (36.6 °C) (Oral)   Resp 16   Ht 5' 11\" (1.803 m)   Wt 207 lb (93.9 kg)   SpO2 98%   BMI 28.87 kg/m²   Temp (24hrs), Av.1 °F (36.7 °C), Min:97.9 °F (36.6 °C), Max:98.3 °F (36.8 °C)    No results for input(s): POCGLU in the last 72 hours. Intake/Output Summary (Last 24 hours) at 1/3/2020 2206  Last data filed at 1/3/2020 1520  Gross per 24 hour   Intake 312.22 ml   Output --   Net 312.22 ml       Physical Exam  Vitals signs and nursing note reviewed. Constitutional:       General: He is not in acute distress. Appearance: He is well-developed. He is not diaphoretic. HENT:      Head: Normocephalic and atraumatic. Right Ear: Hearing normal.      Left Ear: Hearing normal.      Nose: Nose normal. No rhinorrhea. Eyes:      General: Lids are normal.      Extraocular Movements:      Right eye: Normal extraocular motion. Left eye: Normal extraocular motion. Conjunctiva/sclera: Conjunctivae normal.      Right eye: Right conjunctiva is not injected. Left eye: Left conjunctiva is not injected.       Pupils: Pupils are equal, round, and reactive to light. Pupils are equal.      Right eye: Pupil is reactive. Left eye: Pupil is reactive. Neck:      Musculoskeletal: Neck supple. Thyroid: No thyromegaly. Vascular: No carotid bruit. Trachea: Trachea and phonation normal. No tracheal deviation. Cardiovascular:      Rate and Rhythm: Normal rate and regular rhythm. Pulses: Normal pulses. Heart sounds: Normal heart sounds. No murmur. Pulmonary:      Effort: Pulmonary effort is normal. No respiratory distress. Breath sounds: Normal breath sounds. No stridor. No decreased breath sounds. Abdominal:      General: Bowel sounds are normal. There is no distension. Palpations: Abdomen is soft. There is no mass. Tenderness: There is no tenderness. There is no guarding. Musculoskeletal:         General: No tenderness. Skin:     General: Skin is warm and dry. Findings: No erythema, lesion or rash. Neurological:      General: No focal deficit present. Mental Status: He is alert and oriented to person, place, and time. He is not disoriented. GCS: GCS eye subscore is 4. GCS verbal subscore is 5. GCS motor subscore is 6. Cranial Nerves: No cranial nerve deficit. Motor: No weakness. Psychiatric:         Speech: Speech normal.         Behavior: Behavior normal. Behavior is cooperative.          Investigations:      Laboratory Testing:  Recent Results (from the past 24 hour(s))   EKG 12 Lead    Collection Time: 01/03/20  9:08 AM   Result Value Ref Range    Ventricular Rate 65 BPM    Atrial Rate 65 BPM    P-R Interval 158 ms    QRS Duration 90 ms    Q-T Interval 394 ms    QTc Calculation (Bazett) 409 ms    P Axis 63 degrees    R Axis 22 degrees    T Axis 26 degrees   CBC Auto Differential    Collection Time: 01/03/20  9:24 AM   Result Value Ref Range    WBC 3.9 3.5 - 11.3 k/uL    RBC 4.93 4.21 - 5.77 m/uL    Hemoglobin 15.3 13.0 - 17.0 g/dL    Hematocrit 46.8 40.7 - 50.3 %    MCV 94.9 82.6 - 102.9 fL    MCH 31.0 25.2 - 33.5 pg    MCHC 32.7 28.4 - 34.8 g/dL    RDW 12.3 11.8 - 14.4 %    Platelets 273 (L) 028 - 453 k/uL    MPV 10.1 8.1 - 13.5 fL    NRBC Automated 0.0 0.0 per 100 WBC    Differential Type NOT REPORTED     WBC Morphology NOT REPORTED     RBC Morphology NOT REPORTED     Platelet Estimate NOT REPORTED     Seg Neutrophils 52 36 - 65 %    Lymphocytes 27 24 - 43 %    Monocytes 19 (H) 3 - 12 %    Eosinophils % 1 1 - 4 %    Basophils 1 0 - 2 %    Immature Granulocytes 0 0 %    Segs Absolute 2.02 1.50 - 8.10 k/uL    Absolute Lymph # 1.06 (L) 1.10 - 3.70 k/uL    Absolute Mono # 0.75 0.10 - 1.20 k/uL    Absolute Eos # 0.04 0.00 - 0.44 k/uL    Basophils Absolute 0.04 0.00 - 0.20 k/uL    Absolute Immature Granulocyte 0.01 0.00 - 0.30 k/uL   Basic Metabolic Panel w/ Reflex to MG    Collection Time: 01/03/20  9:24 AM   Result Value Ref Range    Glucose 143 (H) 70 - 99 mg/dL    BUN 20 8 - 23 mg/dL    CREATININE 1.52 (H) 0.70 - 1.20 mg/dL    Bun/Cre Ratio 13 9 - 20    Calcium 9.0 8.6 - 10.4 mg/dL    Sodium 138 135 - 144 mmol/L    Potassium 4.3 3.7 - 5.3 mmol/L    Chloride 104 98 - 107 mmol/L    CO2 21 20 - 31 mmol/L    Anion Gap 13 9 - 17 mmol/L    GFR Non-African American 46 (L) >60 mL/min    GFR  56 (L) >60 mL/min    GFR Comment          GFR Staging NOT REPORTED    Troponin    Collection Time: 01/03/20  9:24 AM   Result Value Ref Range    Troponin, High Sensitivity 7 0 - 22 ng/L    Troponin T NOT REPORTED <0.03 ng/mL    Troponin Interp NOT REPORTED    Brain Natriuretic Peptide    Collection Time: 01/03/20  9:24 AM   Result Value Ref Range    Pro-BNP 23 <300 pg/mL    BNP Interpretation Pro-BNP Reference Range:    Protime-INR    Collection Time: 01/03/20  9:24 AM   Result Value Ref Range    Protime 10.6 9.7 - 11.6 sec    INR 1.0    APTT    Collection Time: 01/03/20  9:24 AM   Result Value Ref Range    PTT 27.5 23 - 31 sec   CK    Collection Time: 01/03/20  9:24 AM   Result Value Ref Range    Total  39 - 308 U/L   Lactic Acid    Collection Time: 01/03/20  9:34 AM   Result Value Ref Range    Lactic Acid 1.1 0.5 - 2.2 mmol/L   Troponin    Collection Time: 01/03/20 12:15 PM   Result Value Ref Range    Troponin, High Sensitivity 8 0 - 22 ng/L    Troponin T NOT REPORTED <0.03 ng/mL    Troponin Interp NOT REPORTED    Brain natriuretic peptide    Collection Time: 01/03/20 12:15 PM   Result Value Ref Range    Pro-BNP 27 <300 pg/mL    BNP Interpretation Pro-BNP Reference Range:    Troponin    Collection Time: 01/03/20  3:54 PM   Result Value Ref Range    Troponin, High Sensitivity 7 0 - 22 ng/L    Troponin T NOT REPORTED <0.03 ng/mL    Troponin Interp NOT REPORTED        Imaging/Diagnostics:  Ct Head Wo Contrast    Result Date: 1/3/2020  No acute intracranial process identified. Xr Chest Portable    Result Date: 1/3/2020  No acute process. Assessment :      Hospital Problems           Last Modified POA    * (Principal) Neurocardiogenic syncope 1/3/2020 Yes    Type 2 diabetes mellitus without complication, without long-term current use of insulin ([address] 1/3/2020 Yes    Grade I diastolic dysfunction 4/5/8739 Yes    Overview Signed 1/3/2020  9:23 PM by ARMANI Ibarra CNP     Echo 1/3/2020 Left ventricle is normal in size. Mild left ventricular hypertrophy. Global left ventricular systolic function is normal with an estimated  ejection fraction of 55 % . No obvious wall motion abnormality seen. Grade I (mild) left ventricular diastolic dysfunction. Thickened mitral valve leaflets. Mild mitral regurgitation. Mild tricuspid regurgitation. Estimated right ventricular systolic pressure  is 32 mmHg. No significant pericardial effusion is seen. Aortic root dimension is at upper limit of normal.         Acute kidney injury ([address] 1/3/2020 Yes          Plan:     Patient status observation in the  Med/Surge unit. 1. Consult cardiology, appreciate recommendations.    2. Resume home celexa. 3. EPC cuffs for DVT prophylaxis. 4. Compression stockings while awake. 5. IV hydration. 6. Follow chemistries, monitor renal function. 7. Replete electrolytes as needed. 8. Neuro checks q4h. 9. Monitor vitals. 10. Orthostatic BP and pulse. 11. Telemetry. 12. Cardiac diet. 13. Activity as tolerated with assist.    Plan of care discussed with patient and Brock Case RN.      Consultations:   IP CONSULT TO INTERNAL MEDICINE  IP CONSULT TO SOCIAL WORK  IP CONSULT TO CARDIOLOGY    ARMANI Hartmann CNP  1/3/2020  10:06 PM    Copy sent to Dr. Kimo Gross MD

## 2020-01-06 ENCOUNTER — TELEPHONE (OUTPATIENT)
Dept: FAMILY MEDICINE CLINIC | Age: 65
End: 2020-01-06

## 2020-01-06 NOTE — TELEPHONE ENCOUNTER
[facility] Transitions Initial Follow Up Call    Outreach made within 2 business days of discharge: Yes    Patient: Sharon Glynn Patient : 1955   MRN: [id number]  Reason for Admission: There are no discharge diagnoses documented for the most recent discharge. Discharge Date: 20       Spoke with: PATIENT    Discharge department/facility: [facility] Interactive Patient Contact:  Was patient able to fill all prescriptions: Yes  Was patient instructed to bring all medications to the follow-up visit: N/A  Is patient taking all medications as directed in the discharge summary?  Yes  Does patient understand their discharge instructions: Yes  Does patient have questions or concerns that need addressed prior to 7-14 day follow up office visit: no    PER PATIENT NO APT NEEDED, WILL KEEP 2020 APT    Follow Up  Future Appointments   Date Time Provider Shaniqua Perez   2020  8:00 AM Silvia Sarkar MD [address] [facility]

## 2020-01-21 ENCOUNTER — HOSPITAL ENCOUNTER (OUTPATIENT)
Dept: NON INVASIVE DIAGNOSTICS | Age: 65
Discharge: HOME | End: 2020-01-21
Payer: MEDICARE

## 2020-01-21 PROCEDURE — 93271 ECG/MONITORING AND ANALYSIS: CPT

## 2020-01-21 PROCEDURE — 93270 REMOTE 30 DAY ECG REV/REPORT: CPT

## 2020-01-21 NOTE — FLOWSHEET NOTE
[address] [place] [address] was hooked up to a 28-day Event monitor (244) on 1/21/20 with extra electrodes, verbal as well as written instructions given. He is to come back on 2/4 for a battery/SD change.

## 2020-01-23 ENCOUNTER — HOSPITAL ENCOUNTER (OUTPATIENT)
Dept: NUCLEAR MEDICINE | Age: 65
Discharge: HOME | End: 2020-01-25
Payer: MEDICARE

## 2020-01-23 ENCOUNTER — HOSPITAL ENCOUNTER (OUTPATIENT)
Dept: NON INVASIVE DIAGNOSTICS | Age: 65
Discharge: HOME | End: 2020-01-23
Payer: MEDICARE

## 2020-01-23 VITALS — DIASTOLIC BLOOD PRESSURE: 83 MMHG | SYSTOLIC BLOOD PRESSURE: 149 MMHG | HEART RATE: 86 BPM | RESPIRATION RATE: 18 BRPM

## 2020-01-23 LAB
LV EF: 63 %
LVEF MODALITY: NORMAL

## 2020-01-23 PROCEDURE — 3430000000 HC RX DIAGNOSTIC RADIOPHARMACEUTICAL: Performed by: NURSE PRACTITIONER

## 2020-01-23 PROCEDURE — 78452 HT MUSCLE IMAGE SPECT MULT: CPT

## 2020-01-23 PROCEDURE — A9500 TC99M SESTAMIBI: HCPCS | Performed by: NURSE PRACTITIONER

## 2020-01-23 PROCEDURE — 2580000003 HC RX 258: Performed by: NURSE PRACTITIONER

## 2020-01-23 PROCEDURE — 93017 CV STRESS TEST TRACING ONLY: CPT

## 2020-01-23 RX ORDER — SODIUM CHLORIDE 0.9 % (FLUSH) 0.9 %
10 SYRINGE (ML) INJECTION PRN
Status: DISCONTINUED | OUTPATIENT
Start: 2020-01-23 | End: 2020-01-23 | Stop reason: HOSPADM

## 2020-01-23 RX ORDER — NITROGLYCERIN 0.4 MG/1
0.4 TABLET SUBLINGUAL EVERY 5 MIN PRN
Status: DISCONTINUED | OUTPATIENT
Start: 2020-01-23 | End: 2020-01-23 | Stop reason: HOSPADM

## 2020-01-23 RX ORDER — ATROPINE SULFATE 0.1 MG/ML
0.5 INJECTION INTRAVENOUS EVERY 5 MIN PRN
Status: DISCONTINUED | OUTPATIENT
Start: 2020-01-23 | End: 2020-01-23 | Stop reason: HOSPADM

## 2020-01-23 RX ORDER — SODIUM CHLORIDE 9 MG/ML
500 INJECTION, SOLUTION INTRAVENOUS CONTINUOUS PRN
Status: DISCONTINUED | OUTPATIENT
Start: 2020-01-23 | End: 2020-01-23 | Stop reason: HOSPADM

## 2020-01-23 RX ORDER — ALBUTEROL SULFATE 90 UG/1
2 AEROSOL, METERED RESPIRATORY (INHALATION) PRN
Status: DISCONTINUED | OUTPATIENT
Start: 2020-01-23 | End: 2020-01-23 | Stop reason: HOSPADM

## 2020-01-23 RX ORDER — METOPROLOL TARTRATE 5 MG/5ML
5 INJECTION INTRAVENOUS EVERY 5 MIN PRN
Status: DISCONTINUED | OUTPATIENT
Start: 2020-01-23 | End: 2020-01-23 | Stop reason: HOSPADM

## 2020-01-23 RX ADMIN — TETRAKIS(2-METHOXYISOBUTYLISOCYANIDE)COPPER(I) TETRAFLUOROBORATE 18.7 MILLICURIE: 1 INJECTION, POWDER, LYOPHILIZED, FOR SOLUTION INTRAVENOUS at 08:44

## 2020-01-23 RX ADMIN — TETRAKIS(2-METHOXYISOBUTYLISOCYANIDE)COPPER(I) TETRAFLUOROBORATE 40.4 MILLICURIE: 1 INJECTION, POWDER, LYOPHILIZED, FOR SOLUTION INTRAVENOUS at 12:10

## 2020-01-23 RX ADMIN — SODIUM CHLORIDE 250 ML: 9 INJECTION, SOLUTION INTRAVENOUS at 07:48

## 2020-01-31 NOTE — PROCEDURES
[address] [facility] [address]                                 EVENT MONITOR    PATIENT NAME: Betsy Rucker                 :        1955  MED REC NO:   [id number]                             ROOM:  ACCOUNT NO:   [de-identified]                           ADMIT DATE: 2020  PROVIDER:     Kady Saenz    EVENT MONITOR   122:07-hours    ORDERING PROVIDER:  WALDO Sahni Mai  INDICATION: Syncope and collapse    COMMENTS:  **THIS IS THE FIRST REPORT FOR THIS EVENT MONITOR**  The patient appeared to remain in sinus rhythm with sinus bradycardia  and sinus tachycardia noted. Rare PAC's were noted. The patient events  were noted. IMPRESSION  1. Sinus rhythm with sinus bradycardia and sinus tachycardia. 2.  Rare PAC's.       Dawit Narvaez    D: 2020 13:22:35       T: 2020 13:23:40     MT/YAO  Job#: [id number]     Doc#: Unknown

## 2020-03-12 ENCOUNTER — HOSPITAL ENCOUNTER (OUTPATIENT)
Age: 65
Setting detail: SPECIMEN
Discharge: HOME | End: 2020-03-12
Payer: MEDICARE

## 2020-03-12 LAB
ESTIMATED AVERAGE GLUCOSE: 128 MG/DL
HBA1C MFR BLD: 6.1 % (ref 4–6)

## 2020-07-06 RX ORDER — METFORMIN HYDROCHLORIDE 500 MG/1
TABLET, EXTENDED RELEASE ORAL
Qty: 30 TABLET | Refills: 10 | Status: SHIPPED | OUTPATIENT
Start: 2020-07-06 | End: 2022-08-08 | Stop reason: SDUPTHER

## 2020-11-03 PROBLEM — R55 SYNCOPE AND COLLAPSE: Status: RESOLVED | Noted: 2020-11-03 | Resolved: 2020-11-03

## 2020-11-20 LAB
AVERAGE GLUCOSE: 128
HBA1C MFR BLD: 6.1 %

## 2022-08-08 ENCOUNTER — HOSPITAL ENCOUNTER (OUTPATIENT)
Age: 67
Discharge: HOME | End: 2022-08-10
Payer: MEDICARE

## 2022-08-08 ENCOUNTER — OFFICE VISIT (OUTPATIENT)
Dept: FAMILY MEDICINE CLINIC | Age: 67
End: 2022-08-08
Payer: MEDICARE

## 2022-08-08 ENCOUNTER — HOSPITAL ENCOUNTER (OUTPATIENT)
Dept: GENERAL RADIOLOGY | Age: 67
Discharge: HOME | End: 2022-08-10
Payer: MEDICARE

## 2022-08-08 VITALS
TEMPERATURE: 97.9 F | HEIGHT: 71 IN | OXYGEN SATURATION: 99 % | BODY MASS INDEX: 29.99 KG/M2 | DIASTOLIC BLOOD PRESSURE: 78 MMHG | WEIGHT: 214.2 LBS | SYSTOLIC BLOOD PRESSURE: 140 MMHG | HEART RATE: 84 BPM

## 2022-08-08 DIAGNOSIS — E78.00 PURE HYPERCHOLESTEROLEMIA: ICD-10-CM

## 2022-08-08 DIAGNOSIS — G89.29 CHRONIC MIDLINE BACK PAIN, UNSPECIFIED BACK LOCATION: ICD-10-CM

## 2022-08-08 DIAGNOSIS — M54.9 CHRONIC MIDLINE BACK PAIN, UNSPECIFIED BACK LOCATION: ICD-10-CM

## 2022-08-08 DIAGNOSIS — Z12.5 SCREENING PSA (PROSTATE SPECIFIC ANTIGEN): ICD-10-CM

## 2022-08-08 DIAGNOSIS — E11.9 TYPE 2 DIABETES MELLITUS WITHOUT COMPLICATION, WITHOUT LONG-TERM CURRENT USE OF INSULIN (HCC): Primary | ICD-10-CM

## 2022-08-08 DIAGNOSIS — Z76.89 ENCOUNTER TO ESTABLISH CARE WITH NEW DOCTOR: ICD-10-CM

## 2022-08-08 PROCEDURE — 1036F TOBACCO NON-USER: CPT | Performed by: NURSE PRACTITIONER

## 2022-08-08 PROCEDURE — 2022F DILAT RTA XM EVC RTNOPTHY: CPT | Performed by: NURSE PRACTITIONER

## 2022-08-08 PROCEDURE — 72070 X-RAY EXAM THORAC SPINE 2VWS: CPT

## 2022-08-08 PROCEDURE — 99214 OFFICE O/P EST MOD 30 MIN: CPT | Performed by: NURSE PRACTITIONER

## 2022-08-08 PROCEDURE — 3017F COLORECTAL CA SCREEN DOC REV: CPT | Performed by: NURSE PRACTITIONER

## 2022-08-08 PROCEDURE — 72040 X-RAY EXAM NECK SPINE 2-3 VW: CPT

## 2022-08-08 PROCEDURE — 1123F ACP DISCUSS/DSCN MKR DOCD: CPT | Performed by: NURSE PRACTITIONER

## 2022-08-08 PROCEDURE — 72100 X-RAY EXAM L-S SPINE 2/3 VWS: CPT

## 2022-08-08 PROCEDURE — G8427 DOCREV CUR MEDS BY ELIG CLIN: HCPCS | Performed by: NURSE PRACTITIONER

## 2022-08-08 PROCEDURE — G8417 CALC BMI ABV UP PARAM F/U: HCPCS | Performed by: NURSE PRACTITIONER

## 2022-08-08 PROCEDURE — 3046F HEMOGLOBIN A1C LEVEL >9.0%: CPT | Performed by: NURSE PRACTITIONER

## 2022-08-08 RX ORDER — METFORMIN HYDROCHLORIDE 500 MG/1
TABLET, EXTENDED RELEASE ORAL
Qty: 90 TABLET | Refills: 1 | Status: SHIPPED | OUTPATIENT
Start: 2022-08-08

## 2022-08-08 SDOH — ECONOMIC STABILITY: FOOD INSECURITY: WITHIN THE PAST 12 MONTHS, THE FOOD YOU BOUGHT JUST DIDN'T LAST AND YOU DIDN'T HAVE MONEY TO GET MORE.: NEVER TRUE

## 2022-08-08 SDOH — ECONOMIC STABILITY: FOOD INSECURITY: WITHIN THE PAST 12 MONTHS, YOU WORRIED THAT YOUR FOOD WOULD RUN OUT BEFORE YOU GOT MONEY TO BUY MORE.: NEVER TRUE

## 2022-08-08 ASSESSMENT — PATIENT HEALTH QUESTIONNAIRE - PHQ9
1. LITTLE INTEREST OR PLEASURE IN DOING THINGS: 0
SUM OF ALL RESPONSES TO PHQ9 QUESTIONS 1 & 2: 0
SUM OF ALL RESPONSES TO PHQ QUESTIONS 1-9: 0
SUM OF ALL RESPONSES TO PHQ QUESTIONS 1-9: 0
2. FEELING DOWN, DEPRESSED OR HOPELESS: 0
SUM OF ALL RESPONSES TO PHQ QUESTIONS 1-9: 0
SUM OF ALL RESPONSES TO PHQ QUESTIONS 1-9: 0

## 2022-08-08 ASSESSMENT — SOCIAL DETERMINANTS OF HEALTH (SDOH): HOW HARD IS IT FOR YOU TO PAY FOR THE VERY BASICS LIKE FOOD, HOUSING, MEDICAL CARE, AND HEATING?: NOT HARD AT ALL

## 2022-08-08 NOTE — PROGRESS NOTES
ANKLE SURGERY Left 1970    COLONOSCOPY  2010    INGUINAL HERNIA REPAIR Right 1987    [address] [facility]     History reviewed. No pertinent family history. Social History     Tobacco Use    Smoking status: Never    Smokeless tobacco: Never   Substance Use Topics    Alcohol use: No     ALLERGIES:    Allergies   Allergen Reactions    Erythromycin Nausea And Vomiting          Subjective   Review of Systems   Constitutional:  Negative for activity change, appetite change,unexpected weight change, chills, fever, and fatigue. HENT: Negative for ear pain, sore throat,  Rhinorrhea, sinus pain, sinus pressure, congestion. Eyes:  Negative for pain and discharge. Respiratory:  Negative for chest tightness, shortness of breath, wheezing, and cough. Cardiovascular:  Negative for chest pain, palpitations and leg swelling. Gastrointestinal: Negative for abdominal pain, blood in stool, constipation,diarrhea, nausea and vomiting. Endocrine: Negative for cold intolerance, heat intolerance, polydipsia, polyphagia and polyuria. Genitourinary: Negative for difficulty urinating, dysuria, flank pain, frequency, hematuria and urgency. Musculoskeletal: Negative for arthralgias, joint swelling, myalgias, neck pain and neck stiffness. Positive back pain  Skin: Negative for rash and wound. Allergic/Immunologic: Negative for environmental allergies and food allergies. Neurological:  Negative for dizziness, light-headedness, numbness and headaches. Hematological:  Negative for adenopathy. Does not bruise/bleed easily. Psychiatric/Behavioral: Negative for self-injury, sleep disturbance and suicidal ideas. Objective   Physical Exam   PHYSICAL EXAM:   Constitutional: Barbara Calero is oriented to person, place, and time. Vital signs are normal. Appears well-developed and well-nourished. HEENT:   Head: Normocephalic and atraumatic.    Eyes:PERRL, EOMI, Conjunctiva normal, No discharge. Neck: Full passive range of motion. Non-tender on palpation. Neck supple. No thyromegaly present. Trachea normal.  Cardiovascular: Normal rate, regular rhythm, S1, S2, no murmur, no gallop, no friction rub, intact distal pulses. Pulmonary/Chest: Breath sounds are clear throughout, No respiratory distress, No wheezing, No chest tenderness. Effort normal  Abdominal: Soft. Normal appearance, bowel sounds are present and normoactive. There is no hepatosplenomegaly. There is no tenderness. There is no CVA tenderness. Musculoskeletal: Extremities appear regular and symmetric. No evident masses, lesions, foreign bodies, or other abnormalities. No edema. No tenderness on palpation. Joints are stable. Full ROM, strength and tone are within normal limits. Neurological: Alert and oriented to person, place, and time. Normal motor function, Normal sensory function, No focal deficits noted. He has normal strength. Skin: Skin is warm, dry and intact. No obvious lesions on exposed skin  Psychiatric: Normal mood and affect. Speech is normal and behavior is normal.     Nursing note and vitals reviewed. Blood pressure (!) 140/78, pulse 84, temperature 97.9 °F (36.6 °C), temperature source Temporal, height 5' 11\" (1.803 m), weight 214 lb 3.2 oz (97.2 kg), SpO2 99 %. Body mass index is 29.87 kg/m². Wt Readings from Last 3 Encounters:   08/08/22 214 lb 3.2 oz (97.2 kg)   01/03/20 207 lb (93.9 kg)   10/17/19 217 lb 8 oz (98.7 kg)     BP Readings from Last 3 Encounters:   08/08/22 (!) 140/78   01/23/20 (!) 149/83   01/04/20 134/83       Abstract on 07/08/2021   Component Date Value Ref Range Status    Hemoglobin A1C 11/20/2020 6.1  % Final    AVERAGE GLUCOSE 11/20/2020 128   Final     No results found for this visit on 08/08/22.     Completed Orders/Prescriptions   Orders Placed This Encounter   Medications    metFORMIN (GLUCOPHAGE-XR) 500 MG extended release tablet     Sig: TAKE ONE TABLET BY MOUTH EVERY MORNING     Dispense:  90 tablet     Refill:  1                 AssessmentPlan/Medical Decision Making     1. Type 2 diabetes mellitus without complication, without long-term current use of insulin (HCC)    - metFORMIN (GLUCOPHAGE-XR) 500 MG extended release tablet; TAKE ONE TABLET BY MOUTH EVERY MORNING  Dispense: 90 tablet; Refill: 1  - CBC with Auto Differential; Future  - Comprehensive Metabolic Panel; Future  - TSH with Reflex; Future  - Lipid, Fasting; Future  - Hemoglobin A1C; Future  - Microalbumin, Ur; Future    2. Pure hypercholesterolemia    - CBC with Auto Differential; Future  - Comprehensive Metabolic Panel; Future  - TSH with Reflex; Future  - Lipid, Fasting; Future    3. Chronic midline back pain, unspecified back location    - XR CERVICAL SPINE (2-3 VIEWS); Future  - XR THORACIC SPINE (2 VIEWS); Future  - XR LUMBAR SPINE (2-3 VIEWS); Future    4. Screening PSA (prostate specific antigen)    - PSA Screening; Future    5. Encounter to establish care with new doctor      Return in about 6 months (around 2/8/2023) for chronic conditions. 1.  Bárbara Cedillo received counseling on the following healthy behaviors: nutrition, exercise, and medication adherence  2. Patient given educational materials - see patient instructions  3. Was a self-tracking handout given in paper form or via [employer]? No  If yes, see orders or list here. 4.  Discussed use, benefit, and side effects of prescribed medications. Barriers to medication compliance addressed. All patient questions answered. Pt voiced understanding. 5.  Reviewed prior labs, imaging, consultation, follow up, and health maintenance  6. Continue current medications, diet and exercise. 7. Discussed use, benefit, and side effects of prescribed medications. Barriers to medication compliance addressed. All her questions were answered. Pt voiced understanding. Bárbara Cedillo will continue current medications, diet and exercise.       Of the  30  minute duration appointment visit, Santa Gibbs CNP spent at least 50% of the face-to-face time in counseling, explanation of diagnosis, planning of further management, and answering all questions.           Signed:  Santa Gibbs CNP

## 2022-08-12 DIAGNOSIS — M54.9 CHRONIC MIDLINE BACK PAIN, UNSPECIFIED BACK LOCATION: Primary | ICD-10-CM

## 2022-08-12 DIAGNOSIS — G89.29 CHRONIC MIDLINE BACK PAIN, UNSPECIFIED BACK LOCATION: Primary | ICD-10-CM

## 2022-08-19 ENCOUNTER — HOSPITAL ENCOUNTER (OUTPATIENT)
Age: 67
Setting detail: SPECIMEN
Discharge: HOME | End: 2022-08-19

## 2022-08-19 ENCOUNTER — HOSPITAL ENCOUNTER (OUTPATIENT)
Dept: PHYSICAL THERAPY | Facility: CLINIC | Age: 67
Setting detail: THERAPIES SERIES
Discharge: HOME | End: 2022-08-19
Payer: MEDICARE

## 2022-08-19 DIAGNOSIS — R97.20 ELEVATED PSA, BETWEEN 10 AND LESS THAN 20 NG/ML: Primary | ICD-10-CM

## 2022-08-19 DIAGNOSIS — E78.00 PURE HYPERCHOLESTEROLEMIA: ICD-10-CM

## 2022-08-19 DIAGNOSIS — Z12.5 SCREENING PSA (PROSTATE SPECIFIC ANTIGEN): ICD-10-CM

## 2022-08-19 DIAGNOSIS — E11.9 TYPE 2 DIABETES MELLITUS WITHOUT COMPLICATION, WITHOUT LONG-TERM CURRENT USE OF INSULIN (HCC): ICD-10-CM

## 2022-08-19 LAB
ABSOLUTE EOS #: 0.07 K/UL (ref 0–0.44)
ABSOLUTE IMMATURE GRANULOCYTE: <0.03 K/UL (ref 0–0.3)
ABSOLUTE LYMPH #: 1.97 K/UL (ref 1.1–3.7)
ABSOLUTE MONO #: 0.8 K/UL (ref 0.1–1.2)
ALBUMIN SERPL-MCNC: 4.6 G/DL (ref 3.5–5.2)
ALBUMIN/GLOBULIN RATIO: 1.7 (ref 1–2.5)
ALP BLD-CCNC: 52 U/L (ref 40–129)
ALT SERPL-CCNC: 15 U/L (ref 5–41)
ANION GAP SERPL CALCULATED.3IONS-SCNC: 15 MMOL/L (ref 9–17)
AST SERPL-CCNC: 21 U/L
BASOPHILS # BLD: 1 % (ref 0–2)
BASOPHILS ABSOLUTE: 0.05 K/UL (ref 0–0.2)
BILIRUB SERPL-MCNC: 1.07 MG/DL (ref 0.3–1.2)
BUN BLDV-MCNC: 15 MG/DL (ref 8–23)
CALCIUM SERPL-MCNC: 9.4 MG/DL (ref 8.6–10.4)
CHLORIDE BLD-SCNC: 107 MMOL/L (ref 98–107)
CHOLESTEROL, FASTING: 172 MG/DL
CHOLESTEROL/HDL RATIO: 3.9
CO2: 21 MMOL/L (ref 20–31)
CREAT SERPL-MCNC: 1.41 MG/DL (ref 0.7–1.2)
CREATININE URINE: 187.6 MG/DL (ref 39–259)
EOSINOPHILS RELATIVE PERCENT: 1 % (ref 1–4)
GFR AFRICAN AMERICAN: >60 ML/MIN
GFR NON-AFRICAN AMERICAN: 50 ML/MIN
GFR SERPL CREATININE-BSD FRML MDRD: ABNORMAL ML/MIN/{1.73_M2}
GLUCOSE BLD-MCNC: 90 MG/DL (ref 70–99)
HCT VFR BLD CALC: 48 % (ref 40.7–50.3)
HDLC SERPL-MCNC: 44 MG/DL
HEMOGLOBIN: 16 G/DL (ref 13–17)
IMMATURE GRANULOCYTES: 0 %
LDL CHOLESTEROL: 108 MG/DL (ref 0–130)
LYMPHOCYTES # BLD: 26 % (ref 24–43)
MCH RBC QN AUTO: 31.7 PG (ref 25.2–33.5)
MCHC RBC AUTO-ENTMCNC: 33.3 G/DL (ref 28.4–34.8)
MCV RBC AUTO: 95 FL (ref 82.6–102.9)
MICROALBUMIN/CREAT 24H UR: <12 MG/L
MICROALBUMIN/CREAT UR-RTO: NORMAL MCG/MG CREAT
MONOCYTES # BLD: 10 % (ref 3–12)
NRBC AUTOMATED: 0 PER 100 WBC
PDW BLD-RTO: 13 % (ref 11.8–14.4)
PLATELET # BLD: 198 K/UL (ref 138–453)
PMV BLD AUTO: 10.4 FL (ref 8.1–13.5)
POTASSIUM SERPL-SCNC: 4.4 MMOL/L (ref 3.7–5.3)
PROSTATE SPECIFIC ANTIGEN: 16.55 NG/ML
RBC # BLD: 5.05 M/UL (ref 4.21–5.77)
SEG NEUTROPHILS: 62 % (ref 36–65)
SEGMENTED NEUTROPHILS ABSOLUTE COUNT: 4.82 K/UL (ref 1.5–8.1)
SODIUM BLD-SCNC: 143 MMOL/L (ref 135–144)
THYROXINE, FREE: 1.13 NG/DL (ref 0.93–1.7)
TOTAL PROTEIN: 7.3 G/DL (ref 6.4–8.3)
TRIGLYCERIDE, FASTING: 100 MG/DL
TSH SERPL DL<=0.05 MIU/L-ACNC: 5.65 UIU/ML (ref 0.3–5)
VITAMIN D 25-HYDROXY: 33.4 NG/ML
WBC # BLD: 7.7 K/UL (ref 3.5–11.3)

## 2022-08-19 PROCEDURE — 97161 PT EVAL LOW COMPLEX 20 MIN: CPT

## 2022-08-19 PROCEDURE — 97110 THERAPEUTIC EXERCISES: CPT

## 2022-08-19 NOTE — CONSULTS
[] [facility] [address]  [address]  [phone]  F: [phone] [x] [address]  [facility] [address]   [address]  P: [phone]  F: [phone] [] [address] [facility] &  Therapy  [address] [facility]  P: [phone]  F: [phone] [] [address]  P: [phone]  F: [phone] [] [address]  [address] [address] [facility]   [address]   [place]: [phone]  F: [phone]          Physical Therapy Spine Evaluation    Date:  2022  Patient: Pool Quintanilla  : 1955  MRN: [id number]  Physician: ARMANI Castellanos - CNP    Insurance: MEDICARE (BOMN)/[employer]  Medical Diagnosis: M54.9, G89.29 (ICD-10-CM) - Chronic midline back pain, unspecified back location   Rehab Codes: M54.59, M25.551, M25.651, R29.3 M54.32  Onset Date: 22  Next 's appt. : 23    Subjective:     CC: Pt arrived to PT with c/o low back pain across low back towards sacrum area following a pickle ball injury when pt reached across to get pickle ball and twisted low back both sides about 4 months ago. Pt noted intermittent numbness/tingling to L anterior thigh with prolonged standing. Pt noted pain is constant, increased when getting out of bed in the morning, getting out of chair after sitting for about 10-15 mins. Pt noted CP/HP failed to relieve pain, noted massage gun has been a little helpful. Pt noted OTC NSAIDs failed to relieve pain. Pt noted \"it's more like an uncomfortable feeling than pain\". Pt noted (-) instability or B knee buckling with ambulation. Pt noted enjoys staying active with lifting weight 3x/week, pickle ball 3x/week, walk 2-3 miles/day. Pt noted history of R TKA and L knee meniscal repair. Pain present?  [x] Yes  [] No Location  Low back    Pain Rating currently  (0-10 scale)   3/10   Pain at worse 5/10   Pain at best 0/10   Description of pain Constant, aching   Altered Sensation Intermittent numbness/tingling to L anterior thigh    What makes it worse Standing up after sitting for 10-15 mins, quick \"twisting\" movements to the sides (lunge towards pickle ball)   What makes it better Massage    Symptom progression Same    Sleep Ok       Comorbidities:   [] Obesity [] Dialysis  [x] N/A   [] Asthma/COPD [] Dementia [] Other:   [] Stroke [] Sleep apnea [] Other:   [] Vascular disease [] Rheumatic disease [] Other: PMHx: [x] Refer to full medical chart  In EPIC       [] Unremarkable [] Diabetes    [] HTN        [] Pacemaker      [] MI/Heart Problems       [] Cancer      [] Arthritis  [] Other: [] Other:       Tests:   [x]X-Ray:     Impression   1. No evidence of acute osseous abnormality in the lumbar spine. 2.  Mild-to-moderate degenerative changes. Medications: [x] Refer to full medical record                          [] None                          [] Other:    Allergies:      [x] Refer to full medical record                         [] None                         [] Other:      Function:    Patient live with: Wife    Home type []  One story   [x]  Two story   [] Split level   Stairs from outside          Hand rail [x] Yes - 2 steps            []  No   [x]  Hand rail - Bilat         []  No Hand rail    Stairs inside          Hand rail  [x] Yes - 17 steps            []  No   [x]  Hand rail - R         []  No Hand rail    Bathroom  []  Tub only                  [] Tub/shower combo    [x] Walk in shower         []  Grab bars   Washing machine on []  Main level   [] Second level   [x] Basement   Employment ---   Job status []  Normal duty   [] Light duty   [] Off due to condition    [x]  Retired   [] Not employed   [] Disability  [] Other:  []  Return to work:    Work Activities/duties Recreational   Activities Lifting 3x/week  Playing pickle ball 3x/week  Walking 2-3 miles/week  Gardening/housework       ADL/IADL Previous level of function Current level of function Who currently assists the patient with task   Bathing  [x] Independent  [] Assist [x] Independent  [] Assist    Dress/grooming [x] Independent  [] Assist [x] Independent  [] Assist    Transfer/mobility [x] Independent  [] Assist [x] Independent  [] Assist    Feeding [x] Independent  [] Assist [x] Independent  [] Assist    Toileting [x] Independent  [] Assist [x] Independent  [] Assist    Driving [x] Independent  [] Assist [x] Independent  [] Assist    Housekeeping [x] Independent  [] Assist [x] Independent  [] Assist    Grocery shop/meal prep [x] Independent  [] Assist [x] Independent  [] Assist        Gait Prior level of function Current level of function    [x] Independent  [] Assist [x] Independent  [] Assist   Device: [x] Independent [x] Independent    [] Straight Cane [] Quad cane [] Straight Cane [] Quad cane    [] Standard walker [] Rolling walker   [] 4 wheeled walker [] Standard walker [] Rolling walker   [] 4 wheeled walker    [] Wheelchair [] Wheelchair         Objective:    OBSERVATION No Deficit Deficit Not Tested Comments   Posture       Forward Head [] [x] []    Rounded Shoulders [] [x] []    Kyphosis [] [x] []    Lordosis [] [x] []    Leg Length Discrp [x] [] []    Slumped Sitting [] [x] []    Palpation [] [x] [] TTP @ L lumbar paraspinals, L PSIS, L QL, L SIJ, and R greater trochanter  High tone along lumbar paraspinals L>R   Sensation [x] [] []    Edema [x] [] []    Neurological [x] [] []           STRENGTH    Left Right   L1-2 Hip Flex 5/5 5/5   Hip Abd 5/5 4/5   Hip Ext 5/5 4/5        Abdominals poor poor   Erector Spinae poor poor        *painful    Comment:   5x Bridge to assess glut strength: lateral L weight shift with c/o (+) muscle fatigue and soreness in L glut > R glut     Lumbar    Flexion WFL (+) pain/tightness   Extension WFL (+) pain/tightness   Rotation L WFL (+) pain/tightness R WFL   Sidebend L WFL (+)  pain/tightness R-side R WFL          TESTS (+/-) LEFT RIGHT Not Tested   SLR [x] sit [] supine - - []   Hamstring   (90/90 SLR) - 15 + 25 []   Ely's test    - 90 + 70 []   SKTC - - []   Slump/Dural - - []       Functional Test: Oswestry Score: 19/50 or 38% functionally impaired                                            Branch Fall Risk Assessment    Patient Name:  Teresa Rich  : 1955    Risk Factor Scale  Score   History of Falls [] Yes  [x] No 25  0 0   Secondary Diagnosis [] Yes  [x] No 15  0 0   Ambulatory Aid [] Furniture  [] Crutches/cane/walker  [x] None/bedrest/wheelchair/nurse 30  15  0 0   IV/Heparin Lock [] Yes  [x] No 20  0 0   Gait/Transferring [] Impaired  [] Weak  [x] Normal/bedrest/immobile 20  10  0 0   Mental Status [] Forgets limitations  [x] Oriented to own ability 15  0 0      Total: 0     Based on the Assessment score: check the appropriate box. [x]  No intervention needed   Low =   Score of 0-24    []  Use standard prevention interventions Moderate =  Score of 24-44   [] Give patient handout and discuss fall prevention strategies   [] Establish goal of education for patient/family RE: fall prevention strategies    []  Use high risk prevention interventions High = Score of 45 and higher   [] Give patient handout and discuss fall prevention strategies   [] Establish goal of education for patient/family Re: fall prevention strategies   [] Discuss lifeline / other resources    Electronically signed by: Carmela Corrigan, PT  Date: 2022          Assessment: 68 yo male presents to physical therapy with c/o increased pain across low back following pickle ball injury where pt reached out for ball and twisted low back. Pt also c/o intermittent numbness/tingling in L anterior thigh with prolonged standing.   Pt demo increased muscle tightness in L-sided lumbar with flexion, extension, L rotation, and L sidebend. Pt demo reduced R hip abductors and R hip extensors as pt demo L lateral weight shift with 5x bridges. Pt had a history of R TKA and L knee meniscal scope which may contributed to RLE weakness. Pt also presents with TTP along L lumbar paraspinals, L PSIS, and L SIJ as well as high tone throughout B lumbar paraspinals and QL L>R. Patient would benefit from skilled physical therapy services in order to: manage pain and inflammation, improve overall lumbar mobility, restore core stabilizers strength as well as R hip strength and flexibility to assist pt in returning to prior function. Problems:    [x] ? Pain: 3-5/10 low back  [x] ? ROM: increased muscle tightness in L-sided low back with lumbar flexion/ext/L rot/L SB  [x] ? Strength: core, R hip abductors and extensors   [x] ? Function: Oswestry 38% impaired   [x] Other: impaired posture       STG: (to be met in 7 treatments)  Pt will reduce pain to less than or equal to 3/10 when standing up after sitting for 10-15 mins   Pt will be able to achieve at least 15° R 90/90 SLR and at least 90° R Ely's to demo improved R hamstrings and R hip flexors/quad flexibility to ease difficulty with daily ambulation   Pt will improve R hip abductors and extensors strength to 5/5 to improve stability of lumbar spine and prevent future injury  Pt will have reduced pain/muscle tightness with lumbar flexion/ext/Lrot/L SB AROM to demo improved lumbopelvic rhythm to reduce difficulty when playing pickle ball  Pt to report reduced tenderness to palpation to L PSIS/SIJ to demo improved healing of injured site with reduced inflammation  Patient to be independent with home exercise program as demonstrated by performance with correct form without cues.     LTG: (to be met in 10 treatments)  Pt will improve Oswestry score from 38% impaired to less than 20% impaired to demo improved functional mobility to participate in daily functional activities  Pt will be able to perform 20x bridges with equal weight distribution to demo improved functional strength of R hip extensors to reduce difficulty with sit<>stand transfers  Pt will be able to play full game of pickle ball with minimal to no c/o low back pain      Patient goals: \"remove pain, straighten posture\"      Rehab Potential:  [x] Good  [] Fair  [] Poor   Suggested Professional Referral:  [x] No  [] Yes:  Barriers to Goal Achievement:  [x] No  [] Yes:  Domestic Concerns:  [x] No  [] Yes:      Pt. Education:  [x] Plans/Goals, Risks/Benefits discussed  [x] Home exercise program  Method of Education: [x] Verbal  [x] Demo  [x] Written  Comprehension of Education:  [x] Verbalizes understanding. [x] Demonstrates understanding. [] Needs Review. [] Demonstrates/verbalizes understanding of HEP/Ed previously given. Access Code: [email or web address]  URL: [employer]/  Date: 08/19/2022  Prepared by:  Carmela Grover    Exercises  Supine Transversus Abdominis Bracing - Hands on Stomach - 1 x daily - 7 x weekly - 2 sets - 10 reps  Hooklying Sequential Leg March and Lower - 1 x daily - 7 x weekly - 2 sets - 10 reps  Supine Hamstring Stretch with Strap - 1 x daily - 7 x weekly - 1 sets - 3 reps - 30s hold  Seated Hamstring Stretch - 1 x daily - 7 x weekly - 1 sets - 3 reps - 30s hold  Prone Quadriceps Stretch with Strap - 1 x daily - 7 x weekly - 1 sets - 3 reps - 30s hold  Supine Lower Trunk Rotation - 1 x daily - 7 x weekly - 2 sets - 10 reps - 5s hold  Sidelying Thoracic Rotation with Open Book - 1 x daily - 7 x weekly - 2 sets - 10 reps - 5s hold            Treatment Plan:  [x] Therapeutic Exercise   76702  [] Iontophoresis: 4 mg/mL Dexamethasone Sodium Phosphate  mAmin  22793   [] Therapeutic Activity  65296 [] Vasopneumatic cold with compression  59465    [] Gait Training   82180 [] Ultrasound   [id number]   [x] Neuromuscular Re-education  36323 [] Electrical Stimulation Unattended  23147   [x] Manual Therapy  06314 [] Electrical Stimulation Attended  [id number]   [x] Instruction in HEP  [x] Lumbar/Cervical Traction  [phone]   [] Aquatic Therapy   [id number] [x] Cold/hotpack    [] Massage   [id number]      [] Dry Needling, 1 or 2 muscles  48747   [] Biofeedback, first 15 minutes   62879  [] Biofeedback, additional 15 minutes   60521 [] Dry Needling, 3 or more muscles  73914       Frequency:  2 x/week for 10 visits        Todays Treatment:  Modalities:   Precautions:  Exercises:  Exercise Reps/ Time Weight/ Level Comments         Supine       Abdominal bracing 10x5s     TA w/ marching 2x10      LTR  10x5s                                         Other:    Specific Instructions for next treatment:  - Strengthen core and R hip   - Stretch R hip flexors/quad and R hamstrings  - Improve lumbar mobility   - Postural education   - CP/HP as needed       Evaluation Complexity:  History (Personal factors, comorbidities) [x] 0 [] 1-2 [] 3+   Exam (limitations, restrictions) [] 1-2 [x] 3 [] 4+   Clinical presentation (progression) [x] Stable [] Evolving  [] Unstable   Decision Making [x] Low [] Moderate [] High    [x] Low Complexity [] Moderate Complexity [] High Complexity       Treatment Charges: Mins Units   [x] Evaluation       [x]  Low       []  Moderate       []  High 36 1   []  Modalities     [x]  Ther Exercise 8 1   []  Manual Therapy     []  Ther Activities     []  Aquatics     []  Vasocompression     []  Other       TOTAL TREATMENT TIME: 44 mins    Total Est Medicare Cost (8/19/2022): $119.31    Time in: 2:35 pm       Time out: 3:00 pm    Electronically signed by: Carmela [address] [place] [address],         Physician Signature:________________________________Date:__________________  By signing above or cosigning this note, I have reviewed this plan of care and certify a need for medically necessary rehabilitation services.      *PLEASE SIGN ABOVE AND FAX BACK ALL PAGES*

## 2022-08-19 NOTE — PLAN OF CARE
[] [facility]) - [facility] &  Therapy  [address]  [phone]  F: [phone] [x] [address]  [facility] [address]   [address]  P: [phone]  F: [phone] [] [address] &  Therapy  [address]  P: [phone]  F: [phone] [] [address]  P: [phone]  F: [phone] [] [address]  [address] [address] [facility]   [address]   [place]: [phone]  F: [phone]        Physical Therapy Plan of Care    Date:  2022  Patient: Nel Ch  : 1955  MRN: [id number]  Physician: ARMANI Castillo CNP                      Insurance: MEDICARE (BOMN)/[employer] [place] [employer]  Medical Diagnosis: M54.9, G89.29 (ICD-10-CM) - Chronic midline back pain, unspecified back location   Rehab Codes: M54.59, M25.551, M25.651, R29.3 M54.32  Onset Date: 22                 Next 's appt. : 23       Subjective:      CC: Pt arrived to PT with c/o low back pain across low back towards sacrum area following a pickle ball injury when pt reached across to get pickle ball and twisted low back both sides about 4 months ago. Pt noted intermittent numbness/tingling to L anterior thigh with prolonged standing. Pt noted pain is constant, increased when getting out of bed in the morning, getting out of chair after sitting for about 10-15 mins. Pt noted CP/HP failed to relieve pain, noted massage gun has been a little helpful. Pt noted OTC NSAIDs failed to relieve pain. Pt noted \"it's more like an uncomfortable feeling than pain\". Pt noted (-) instability or B knee buckling with ambulation. Pt noted enjoys staying active with lifting weight 3x/week, pickle ball 3x/week, walk 2-3 miles/day. Pt noted history of R TKA and L knee meniscal repair.        Pain present? [x] Yes  [] No          Location  Low back    Pain Rating currently  (0-10 scale)   3/10   Pain at worse 5/10   Pain at best 0/10   Description of pain Constant, aching   Altered Sensation Intermittent numbness/tingling to L anterior thigh    What makes it worse Standing up after sitting for 10-15 mins, quick \"twisting\" movements to the sides (lunge towards pickle ball)   What makes it better Massage    Symptom progression Same    Sleep Ok           Assessment: 68 yo male presents to physical therapy with c/o increased pain across low back following pickle ball injury where pt reached out for ball and twisted low back. Pt also c/o intermittent numbness/tingling in L anterior thigh with prolonged standing. Pt demo increased muscle tightness in L-sided lumbar with flexion, extension, L rotation, and L sidebend. Pt demo reduced R hip abductors and R hip extensors as pt demo L lateral weight shift with 5x bridges. Pt had a history of R TKA and L knee meniscal scope which may contributed to RLE weakness. Pt also presents with TTP along L lumbar paraspinals, L PSIS, and L SIJ as well as high tone throughout B lumbar paraspinals and QL L>R. Patient would benefit from skilled physical therapy services in order to: manage pain and inflammation, improve overall lumbar mobility, restore core stabilizers strength as well as R hip strength and flexibility to assist pt in returning to prior function. Problems:    [x] ? Pain: 3-5/10 low back  [x] ? ROM: increased muscle tightness in L-sided low back with lumbar flexion/ext/L rot/L SB  [x] ? Strength: core, R hip abductors and extensors   [x] ?  Function: Oswestry 38% impaired   [x] Other: impaired posture         STG: (to be met in 7 treatments)  Pt will reduce pain to less than or equal to 3/10 when standing up after sitting for 10-15 mins   Pt will be able to achieve at least 15° R 90/90 SLR and at least 90° R Ely's to demo improved R hamstrings and R hip flexors/quad flexibility to ease difficulty with daily ambulation   Pt will improve R hip abductors and extensors strength to 5/5 to improve stability of lumbar spine and prevent future injury  Pt will have reduced pain/muscle tightness with lumbar flexion/ext/Lrot/L SB AROM to demo improved lumbopelvic rhythm to reduce difficulty when playing pickle ball  Pt to report reduced tenderness to palpation to L PSIS/SIJ to demo improved healing of injured site with reduced inflammation  Patient to be independent with home exercise program as demonstrated by performance with correct form without cues.      LTG: (to be met in 10 treatments)  Pt will improve Oswestry score from 38% impaired to less than 20% impaired to demo improved functional mobility to participate in daily functional activities  Pt will be able to perform 20x bridges with equal weight distribution to demo improved functional strength of R hip extensors to reduce difficulty with sit<>stand transfers  Pt will be able to play full game of pickle ball with minimal to no c/o low back pain     Patient goals: \"remove pain, straighten posture\"         Treatment Plan:  [x] Therapeutic Exercise  74047             [] Iontophoresis: 4 mg/mL Dexamethasone Sodium Phosphate  mAmin  48760   [] Therapeutic Activity  79677 [] Vasopneumatic cold with compression  27871               [] Gait Training    02526 [] Ultrasound        [id number]   [x] Neuromuscular Re-education  66100 [] Electrical Stimulation Unattended  13100   [x] Manual Therapy  81174 [] Electrical Stimulation Attended  16191   [x] Instruction in HEP       [x] Lumbar/Cervical Traction  78334   [] Aquatic Therapy           49643 [x] Cold/hotpack     [] Massage  04911      [] Dry Needling, 1 or 2 muscles  38747   [] Biofeedback, first 15 minutes  14182  [] Biofeedback, additional 15 minutes  69422 [] Dry Needling, 3 or more muscles  20747         Frequency:  2 x/week for 10 visits        Electronically signed by: Carmela Wyatt PT        Physician Signature:________________________________Date:__________________  By signing above or cosigning this note, I have reviewed this plan of care and certify a need for medically necessary rehabilitation services.      *PLEASE SIGN ABOVE AND FAX BACK ALL PAGES*

## 2022-08-20 LAB
ESTIMATED AVERAGE GLUCOSE: 128 MG/DL
HBA1C MFR BLD: 6.1 % (ref 4–6)

## 2022-08-24 ENCOUNTER — HOSPITAL ENCOUNTER (OUTPATIENT)
Dept: PHYSICAL THERAPY | Facility: CLINIC | Age: 67
Setting detail: THERAPIES SERIES
Discharge: HOME | End: 2022-08-24
Payer: MEDICARE

## 2022-08-24 PROCEDURE — 97110 THERAPEUTIC EXERCISES: CPT

## 2022-08-24 NOTE — FLOWSHEET NOTE
[] [address]  [address]  [phone]  F: [phone] [x] [address]  [facility]   [address]  P: [phone]  F: [phone] [] [address]  [address]  P: [phone]  F: [phone] [] [address]  P: [phone]  F: [phone] [] [address]  [facility]   [address]   [place]: [phone]  F: [phone]      Physical Therapy Daily Treatment Note    Date:  2022  Patient Name:  Kassy Garcia    :  1955  MRN: [id number]  Physician: ARMANI Herrera CNP                      Insurance: MEDICARE (BOMN)/[employer]  Medical Diagnosis: M54.9, G89.29 (ICD-10-CM) - Chronic midline back pain, unspecified back location   Rehab Codes: M54.59, M25.551, M25.651, R29.3 M54.32  Onset Date: 22                 Next 's appt. : 23    Visit# / total visits: 2/10; Progress note for Medicare patient due at visit 5     Cancels/No Shows: 0/0    Subjective:    Pain:  [x] Yes  [] No Location: low back  Pain Rating: (0-10 scale) 1-2/10  Pain altered Tx:  [x] No  [] Yes  Action:  Comments: Pt arrives stating he has completed stretches. at home and would like to trial traction for spinal decompression.      Objective:  Modalities:   Precautions:  Exercises:  Exercise Reps/ Time Weight/ Level Comments   Nustep  5'            Supine       LTR  5x5\"ea      TA w/ marching  2x10      Abdominal bracing  10x5\"            Standing       Gastroc stretch on SB  2x1'      HS stretch on step  2x30\"ea  12\"     Back extension  10x      Side bend  5xea      3 way hip  15xea A  UE assist    Mini squats 10x            Core strength focus      Paloff press  10xea  Purple     Extension  2x10  Purple Diagonal chops  10xea  2.2# ball          Seated       Flexion ball roll outs  10x  Blue PB     Trunk rotation  5x5\"ea                              Other:      Treatment Charges: Mins Units   []  Modalities     [x]  Ther Exercise 40 3   []  Manual Therapy     []  Ther Activities     []  Aquatics     []  Vasocompression     []  Other     Total Treatment time 40 3     Total Est Medicare Cost (8/19/2022): $183.65    Assessment: [x] Progressing toward goals. Initiated treatment with Nustep warm up followed by sunny table exercises with good tolerance. Implemented multiple lumbar mobility stretches and with no increase of pain noted. Focused on core engagement and pt education this date. Therapist advised pt to use HP for pain management at home with verbal understanding. Will assess carry over next session. [] No change. [] Other:  [x] Patient would continue to benefit from skilled physical therapy services in order to: manage pain and inflammation, improve overall lumbar mobility, restore core stabilizers strength as well as R hip strength and flexibility to assist pt in returning to prior function. Problems:    [x] ? Pain: 3-5/10 low back  [x] ? ROM: increased muscle tightness in L-sided low back with lumbar flexion/ext/L rot/L SB  [x] ? Strength: core, R hip abductors and extensors   [x] ?  Function: Oswestry 38% impaired   [x] Other: impaired posture         STG: (to be met in 7 treatments)  Pt will reduce pain to less than or equal to 3/10 when standing up after sitting for 10-15 mins   Pt will be able to achieve at least 15° R 90/90 SLR and at least 90° R Ely's to demo improved R hamstrings and R hip flexors/quad flexibility to ease difficulty with daily ambulation   Pt will improve R hip abductors and extensors strength to 5/5 to improve stability of lumbar spine and prevent future injury  Pt will have reduced pain/muscle tightness with lumbar flexion/ext/Lrot/L SB AROM to demo improved lumbopelvic rhythm to reduce difficulty when playing pickle ball  Pt to report reduced tenderness to palpation to L PSIS/SIJ to demo improved healing of injured site with reduced inflammation  Patient to be independent with home exercise program as demonstrated by performance with correct form without cues. LTG: (to be met in 10 treatments)  Pt will improve Oswestry score from 38% impaired to less than 20% impaired to demo improved functional mobility to participate in daily functional activities  Pt will be able to perform 20x bridges with equal weight distribution to demo improved functional strength of R hip extensors to reduce difficulty with sit<>stand transfers  Pt will be able to play full game of pickle ball with minimal to no c/o low back pain     Patient goals: \"remove pain, straighten posture\"    Pt. Education:  [x] Yes  [] No  [x] Reviewed Prior HEP/Ed  Method of Education: [x] Verbal  [x] Demo  [] Written  Comprehension of Education:  [x] Verbalizes understanding. [x] Demonstrates understanding. [] Needs review. [x] Demonstrates/verbalizes HEP/Ed previously given. Access Code: [email or web address]  URL: "[email or web address]  Date: 08/19/2022  Prepared by: Carmela CrowderKimo Nalcrest     Exercises  Supine Transversus Abdominis Bracing - Hands on Stomach - 1 x daily - 7 x weekly - 2 sets - 10 reps  Hooklying Sequential Leg March and Lower - 1 x daily - 7 x weekly - 2 sets - 10 reps  Supine Hamstring Stretch with Strap - 1 x daily - 7 x weekly - 1 sets - 3 reps - 30s hold  Seated Hamstring Stretch - 1 x daily - 7 x weekly - 1 sets - 3 reps - 30s hold  Prone Quadriceps Stretch with Strap - 1 x daily - 7 x weekly - 1 sets - 3 reps - 30s hold  Supine Lower Trunk Rotation - 1 x daily - 7 x weekly - 2 sets - 10 reps - 5s hold  Sidelying Thoracic Rotation with Open Book - 1 x daily - 7 x weekly - 2 sets - 10 reps - 5s hold     Plan: [x] Continue current frequency toward long and short term goals.     [x] Specific Instructions for subsequent treatments: progress per poc.        Time In: 12:00 pm        Time Out: 12:45 pm     Electronically signed by:  Edilma Recio PTA

## 2022-08-30 ENCOUNTER — HOSPITAL ENCOUNTER (OUTPATIENT)
Dept: PHYSICAL THERAPY | Facility: CLINIC | Age: 67
Setting detail: THERAPIES SERIES
Discharge: HOME | End: 2022-08-30
Payer: MEDICARE

## 2022-08-30 PROCEDURE — 97140 MANUAL THERAPY 1/> REGIONS: CPT

## 2022-08-30 PROCEDURE — 97110 THERAPEUTIC EXERCISES: CPT

## 2022-08-30 NOTE — FLOWSHEET NOTE
[] [facility]  [address]  [phone]  F: [phone] [x] [address]  [facility] 36   [address]  P: [phone]  F: [phone] [] [address]  [address]  P: [phone]  F: [phone] [] [address]  P: [phone]  F: [phone] [] [address]  [facility]   [address]   [place]: [phone]  F: [phone]      Physical Therapy Daily Treatment Note    Date:  2022  Patient Name:  Dania Jacome    :  1955  MRN: [id number]  Physician: El Blizzard, APRN - CNP                      Insurance: MEDICARE (BOMN)/[employer]  Medical Diagnosis: M54.9, G89.29 (ICD-10-CM) - Chronic midline back pain, unspecified back location   Rehab Codes: M54.59, M25.551, M25.651, R29.3 M54.32  Onset Date: 22                 Next 's appt. : 23    Visit# / total visits: 3/10; Progress note for Medicare patient due at visit 5     Cancels/No Shows: 0/0    Subjective:    Pain:  [x] Yes  [] No  Location: low back   Pain Rating: (0-10 scale) 2/10  Pain altered Tx:  [x] No  [] Yes  Action:  Comments: Pt arrived without change in pain level in low back rated 2/10 due to reaching far out to R side while playing pickleball. Pt noted took a week off of pickleball and weight lifting then returned on . Pt noted returned to gym to do bench press 95#, leg press machine 105#, and squat without increased of pain noted. Pt noted cont to have pain when quickly twisted to 1 side, with prolonged standing, and when standing up from prolonged sitting. Pt verbalized wanting to learn some \"quick stretch\" prior to playing pickleball or walking the dog.         Objective:  Modalities: Precautions:  Exercises:  Exercise Reps/ Time Weight/ Level Comments   Nustep  5'   Not today         Supine       LTR  10x5\"ea       TA w/ marching  2x10      Abdominal bracing  10x5\"   Not today    SKTC 3x30s     TA w/ heel walkout 10x ea            Standing       Gastroc stretch on SB  2x1'      HS stretch on step  2x30\"ea  12\"     Back extension  10x      Side bend  5xea      3 way hip  15xea A  UE assist    Mini squats 10x            Core strength focus      Paloff press  10xea  Purple     Extension  2x10  Purple     Diagonal chops  10x ea  Purple Changed from 2.2#ball to purple TB 8/30         Seated       Ball roll outs  10x ea Blue PB  Forward  Added diagonal 8/30   Trunk rotation  5x5\"ea                              Other:    Manual Therapy: MFR via manual to B lumbar paraspinals & B QL L>R to reduce muscular tension and \"knot\" on L side of low back followed by A-P mob through lumbar spine - 10 mins        Specific Instructions for next treatment:  - Strengthen core and R hip   - Stretch R hip flexors/quad and R hamstrings  - Improve lumbar mobility   - Postural education   - CP/HP as needed        Treatment Charges: Mins Units   []  Modalities     [x]  Ther Exercise 30 2   [x]  Manual Therapy 10 1   []  Ther Activities     []  Aquatics     []  Vasocompression     []  Other     Total Treatment time 40 3     Total Est Medicare Cost (8/30/2022): $255.27    Assessment: [x] Progressing toward goals. Initiated therapy session with mat exercises to improve lumbar tissue extensibility. Implemented 10 mins of manual focus on myofascial release technique to B lumbar paraspinals and B QL L>R due to palpable knot in L-sided low back and overall muscular guarding/tightness. Pt reported \"it feels really good\" upon completion of manual despite reported no change of symptoms. Cont with various core stabilizers strengthening exs in standing and end with seated ball roll out to progress overall function.   Cues provided throughout for proper form and technique due to fair recall noted. Pt was instructed to perform lumbar stretching exs per HEP prior to playing pickleball and engage core throughout when walking dog in the morning, while playing pickleball, and in standing to reduce strain on lumbar spine. Pt was also educated on proper upright posture and use towel roll to place behind back for cuing when sitting in chair to promote postural awareness. Updated and reviewed HEP with added SKTC this date. Pt again advised to apply CP/HP to reduce muscle tightness and pain at home as pt deferred CP/HP post-exs. Will cont to monitor and progress as able. [] No change. [] Other:  [x] Patient would continue to benefit from skilled physical therapy services in order to: manage pain and inflammation, improve overall lumbar mobility, restore core stabilizers strength as well as R hip strength and flexibility to assist pt in returning to prior function. Problems:    [x] ? Pain: 3-5/10 low back  [x] ? ROM: increased muscle tightness in L-sided low back with lumbar flexion/ext/L rot/L SB  [x] ? Strength: core, R hip abductors and extensors   [x] ?  Function: Oswestry 38% impaired   [x] Other: impaired posture         STG: (to be met in 7 treatments)  Pt will reduce pain to less than or equal to 3/10 when standing up after sitting for 10-15 mins   Pt will be able to achieve at least 15° R 90/90 SLR and at least 90° R Ely's to demo improved R hamstrings and R hip flexors/quad flexibility to ease difficulty with daily ambulation   Pt will improve R hip abductors and extensors strength to 5/5 to improve stability of lumbar spine and prevent future injury  Pt will have reduced pain/muscle tightness with lumbar flexion/ext/Lrot/L SB AROM to demo improved lumbopelvic rhythm to reduce difficulty when playing pickle ball  Pt to report reduced tenderness to palpation to L PSIS/SIJ to demo improved healing of injured site with 10:00 am    Electronically signed by:   Carmela Corrigan, PT

## 2022-09-01 ENCOUNTER — HOSPITAL ENCOUNTER (OUTPATIENT)
Dept: PHYSICAL THERAPY | Facility: CLINIC | Age: 67
Setting detail: THERAPIES SERIES
Discharge: HOME | End: 2022-09-01
Payer: MEDICARE

## 2022-09-01 PROCEDURE — 97140 MANUAL THERAPY 1/> REGIONS: CPT

## 2022-09-01 PROCEDURE — 97110 THERAPEUTIC EXERCISES: CPT

## 2022-09-01 NOTE — FLOWSHEET NOTE
[] [address]  [address]  [phone]  F: [phone] [x] [address]  [facility] 36   [address]  P: [phone]  F: [phone] [] [address]  [address]  P: [phone]  F: [phone] [] [address]  P: [phone]  F: [phone] [] [address]  [facility]   [address]   [place]: [phone]  F: [phone]      Physical Therapy Daily Treatment Note    Date:  2022  Patient Name:  George Escobedo    :  1955  MRN: [id number]  Physician: ARMANI Hernandez - SHARI                      Insurance: MEDICARE (BOMN)/[employer]  Medical Diagnosis: M54.9, G89.29 (ICD-10-CM) - Chronic midline back pain, unspecified back location   Rehab Codes: M54.59, M25.551, M25.651, R29.3 M54.32  Onset Date: 22                 Next 's appt. : 23    Visit# / total visits: 4/10; Progress note for Medicare patient due at visit 5     Cancels/No Shows: 0/0    Subjective:    Pain:  [] Yes  [x] No  Location: low back   Pain Rating: (0-10 scale) 'stiff'/10  Pain altered Tx:  [x] No  [] Yes  Action:  Comments:Pt arrives stating he has felt good since last session stating the manual helped. Able to play pickleball the other day without incident. Went to the gym Tuesday and  noting it went just fine. Does have soreness in core musculature today.        Objective:  Modalities:   Precautions:  Exercises:  Exercise Reps/ Time Weight/ Level Comments   Nustep  5'   Not today         Supine       LTR  10x5\"ea       TA w/ marching  2x10      Abdominal bracing  10x5\"   Not today    SKTC 3x30s     TA w/ heel walkout 10x ea            Standing       Gastroc stretch on SB  2x1'   Not today    HS stretch on step  2x30\"ea  12\"     Hip flexor stretch on step 2x30\" ea 12\"          Back extension  15x      Side bend  5xea      3 way hip  15xea A  UE assist    Mini squats 10x            Core strength focus      Paloff press  10xea  Plum x2    Extension  2x10  Plum     Diagonal chops  10x ea  Plum x2 Changed from 2.2# ball to purple TB 8/30   Paloff walk out w/mini squat  10xea  Single Blue  New 9/1               Seated       Ball roll outs  10x5\" ea Blue PB  Forward  Added diagonal 8/30   Trunk rotation  5x5\"ea  1.1# ball  Added wt 9/1                           Other:    Manual Therapy: MFR via manual to B lumbar paraspinals & B QL L>R to reduce muscular tension and \"knot\" on L side of low back followed by A-P mob through lumbar spine - 10 mins        Specific Instructions for next treatment:  - Strengthen core and R hip   - Stretch R hip flexors/quad and R hamstrings  - Improve lumbar mobility   - Postural education   - CP/HP as needed        Treatment Charges: Mins Units   []  Modalities     [x]  Ther Exercise 50 3   [x]  Manual Therapy 10 1   []  Ther Activities     []  Aquatics     []  Vasocompression     []  Other     Total Treatment time 60 4     Total Est Medicare Cost (9/1/2022): $337.95    Assessment: [x] Progressing toward goals. Initiated session with mat stretches and core exercises followed by hypervolt to reduce muscular tension throughout paraspinals and QL. Added weighted ball to trunk rotation stretch and implemented paloff walk outs while maintaining mini squat to challenge core/glute strength. Hip flexor stretch added at stairs for increased ms flexibility. Demonstrates toe lift with mini squats needing cues for proper mechanics. Instructed through seated lumbar flexion, rotation, and hs stretches before pickleball. Pt tolerated session well. [] No change.      [] Other:  [x] Patient would continue to benefit from skilled physical therapy services in order to: manage pain and inflammation, improve overall lumbar mobility, restore core stabilizers strength as well as R hip strength and flexibility to assist pt in returning to prior function. Problems:    [x] ? Pain: 3-5/10 low back  [x] ? ROM: increased muscle tightness in L-sided low back with lumbar flexion/ext/L rot/L SB  [x] ? Strength: core, R hip abductors and extensors   [x] ? Function: Oswestry 38% impaired   [x] Other: impaired posture         STG: (to be met in 7 treatments)  Pt will reduce pain to less than or equal to 3/10 when standing up after sitting for 10-15 mins   Pt will be able to achieve at least 15° R 90/90 SLR and at least 90° R Ely's to demo improved R hamstrings and R hip flexors/quad flexibility to ease difficulty with daily ambulation   Pt will improve R hip abductors and extensors strength to 5/5 to improve stability of lumbar spine and prevent future injury  Pt will have reduced pain/muscle tightness with lumbar flexion/ext/Lrot/L SB AROM to demo improved lumbopelvic rhythm to reduce difficulty when playing pickle ball  Pt to report reduced tenderness to palpation to L PSIS/SIJ to demo improved healing of injured site with reduced inflammation  Patient to be independent with home exercise program as demonstrated by performance with correct form without cues. LTG: (to be met in 10 treatments)  Pt will improve Oswestry score from 38% impaired to less than 20% impaired to demo improved functional mobility to participate in daily functional activities  Pt will be able to perform 20x bridges with equal weight distribution to demo improved functional strength of R hip extensors to reduce difficulty with sit<>stand transfers  Pt will be able to play full game of pickle ball with minimal to no c/o low back pain     Patient goals: \"remove pain, straighten posture\"    Pt.  Education:  [x] Yes  [] No  [x] Reviewed Prior HEP/Ed  Method of Education: [x] Verbal  [x] Demo  [] Written  Comprehension of Education:  [x] [employer] understanding. [x] Demonstrates understanding. [] Needs review. [x] Demonstrates/verbalizes HEP/Ed previously given. Access Code: [email or web address]  URL: [email or web address]  Date: 08/30/2022  Prepared by: Carmela Lopez    Exercises  Supine Transversus Abdominis Bracing - Hands on Stomach - 1 x daily - 7 x weekly - 2 sets - 10 reps  Hooklying Sequential Leg March and Lower - 1 x daily - 7 x weekly - 2 sets - 10 reps  Supine Hamstring Stretch with Strap - 1 x daily - 7 x weekly - 1 sets - 3 reps - 30s hold  Seated Hamstring Stretch - 1 x daily - 7 x weekly - 1 sets - 3 reps - 30s hold  Prone Quadriceps Stretch with Strap - 1 x daily - 7 x weekly - 1 sets - 3 reps - 30s hold  Supine Lower Trunk Rotation - 1 x daily - 7 x weekly - 2 sets - 10 reps - 5s hold  Sidelying Thoracic Rotation with Open Book - 1 x daily - 7 x weekly - 2 sets - 10 reps - 5s hold  Supine Single Knee to Chest Stretch - 1 x daily - 7 x weekly - 2 sets - 10 reps      Plan: [x] Continue current frequency toward long and short term goals.     [x] Specific Instructions for subsequent treatments: progress per poc., update HEP      Time In: 9:00 am         Time Out: 10:00 am    Electronically signed by:  Gale Pantoja PTA

## 2022-09-08 ENCOUNTER — HOSPITAL ENCOUNTER (OUTPATIENT)
Dept: PHYSICAL THERAPY | Facility: CLINIC | Age: 67
Setting detail: THERAPIES SERIES
Discharge: HOME | End: 2022-09-08
Payer: MEDICARE

## 2022-09-08 PROCEDURE — 97110 THERAPEUTIC EXERCISES: CPT

## 2022-09-08 PROCEDURE — 97140 MANUAL THERAPY 1/> REGIONS: CPT

## 2022-09-08 NOTE — FLOWSHEET NOTE
[] [address]  [address]  [phone]  F: [phone] [x] [address]  [place] [address]   [address]  P: [phone]  F: [phone] [] [address]  [address]  P: [phone]  F: [phone] [] [address]  P: [phone]  F: [phone] [] [address]  [facility]   [address]   [place]: [phone]  F: [phone]      Physical Therapy Daily Treatment Note    Date:  2022  Patient Name:  Rosalia Jackson    :  1955  MRN: [id number]  Physician: ARMANI Mehta CNP                      Insurance: MEDICARE (BOMN)/[employer]  Medical Diagnosis: M54.9, G89.29 (ICD-10-CM) - Chronic midline back pain, unspecified back location   Rehab Codes: M54.59, M25.551, M25.651, R29.3 M54.32  Onset Date: 22                 Next 's appt. : 23    Visit# / total visits: 5/10; Progress note for Medicare patient due at visit 6     Cancels/No Shows: 0/0    Subjective:    Pain:  [x] Yes  [] No  Location: low back   Pain Rating: (0-10 scale) 2/10  Pain altered Tx:  [x] No  [] Yes  Action:  Comments: Pt arrives noting he feels exercises are not what he needs. Reports constant irritation in LB that is low on the pain scale but stays consistent. Mentions he feels most relief with manual. Increased ms soreness with exercises.        Objective:  Modalities:   Precautions:  Exercises: bolded completed 22    Exercise Reps/ Time Weight/ Level Comments   Nustep  5'   Not today         Supine:      LTR  10x5\"ea       TA w/ marching  2x10      Abdominal bracing  10x5\"   Not today    SKTC 3x30s     DKTC 3x30s  New 9/8   Bridge  10x  New 9/8   TA w/ heel walkout 10x   Alt LE's Standing       Gastroc stretch on SB  x1'      HS stretch on step  1' ea  12\"     Hip flexor stretch on step 2x30\" ea 12\"          Back extension  15x      Side bend  5xea      2 way hip abd, ext 10xea Blue  UE assist; added resistance 9/8   Mini squats 10x      TRX squats  10x  New 9/8   Power steps 10xea 6\" New 9/8   Monster walks-lateral 30'ea Blue  New 9/8; with mini squat          Core strength focus:      Paloff press  10xea  Plum x2    Extension  2x10  Plum     Diagonal chops  10x ea  Plum x2 Changed from 2.2# ball to purple TB 8/30   Paloff walk out w/mini squat  10xea  Single Blue  New 9/1               Seated       Ball roll outs  10x5\" ea Blue PB  Forward  Added diagonal 8/30   Trunk rotation  5x5\"ea  2.2# ball  Added wt 9/1, 9/8         Prone    New 9/8   Press ups  10x  With OP at hips    Alt hip ext  10x     Other:    Manual Therapy: MFR via manual to B lumbar paraspinals & B QL L>R to reduce muscular tension and \"knot\" on L side of low back followed by A-P mob through lumbar spine - 10 mins        Specific Instructions for next treatment:  - Strengthen core and R hip   - Stretch R hip flexors/quad and R hamstrings  - Improve lumbar mobility   - Postural education   - CP/HP as needed        Treatment Charges: Mins Units   []  Modalities     [x]  Ther Exercise 45 3   [x]  Manual Therapy 10 1   []  Ther Activities     []  Aquatics     []  Vasocompression     []  Other     Total Treatment time 55 4     Total Est Medicare Cost (9/8/2022): $420.63    Assessment: [x] Progressing toward goals. Initiated session with mat stretching adding DKTC and increased weighted ball for trunk rotation. Continued with manual via hypervolt to lumbar paraspinals and QL with focus on L side. Applied mobs to lumbar spine to improve mobility with good response post manual. Implemented prone press ups with OP at hips to increase extensor ROM.  Added alt hip extension in prone, monster walks, power steps,TRX squats, and resisted 2 way hip to progress hip strengthening. Cues for proper execution of new exercises with good carryover. Pt tolerated session well. [] No change. [] Other:  [x] Patient would continue to benefit from skilled physical therapy services in order to: manage pain and inflammation, improve overall lumbar mobility, restore core stabilizers strength as well as R hip strength and flexibility to assist pt in returning to prior function. Problems:    [x] ? Pain: 3-5/10 low back  [x] ? ROM: increased muscle tightness in L-sided low back with lumbar flexion/ext/L rot/L SB  [x] ? Strength: core, R hip abductors and extensors   [x] ? Function: Oswestry 38% impaired   [x] Other: impaired posture         STG: (to be met in 7 treatments)  Pt will reduce pain to less than or equal to 3/10 when standing up after sitting for 10-15 mins   Pt will be able to achieve at least 15° R 90/90 SLR and at least 90° R Ely's to demo improved R hamstrings and R hip flexors/quad flexibility to ease difficulty with daily ambulation   Pt will improve R hip abductors and extensors strength to 5/5 to improve stability of lumbar spine and prevent future injury  Pt will have reduced pain/muscle tightness with lumbar flexion/ext/Lrot/L SB AROM to demo improved lumbopelvic rhythm to reduce difficulty when playing pickle ball  Pt to report reduced tenderness to palpation to L PSIS/SIJ to demo improved healing of injured site with reduced inflammation  Patient to be independent with home exercise program as demonstrated by performance with correct form without cues.      LTG: (to be met in 10 treatments)  Pt will improve Oswestry score from 38% impaired to less than 20% impaired to demo improved functional mobility to participate in daily functional activities  Pt will be able to perform 20x bridges with equal weight distribution to demo improved functional strength of R hip extensors to reduce difficulty with sit<>stand transfers  Pt will be able to play full game of pickle ball with minimal to no c/o low back pain     Patient goals: \"remove pain, straighten posture\"    Pt. Education:  [x] Yes  [] No  [x] Reviewed Prior HEP/Ed  Method of Education: [x] Verbal  [x] Demo  [] Written  Comprehension of Education:  [x] Verbalizes understanding. [x] Demonstrates understanding. [] Needs review. [x] Demonstrates/verbalizes HEP/Ed previously given. Access Code: [email or web address]  URL: [employer]/  Date: 08/30/2022  Prepared by: Carmela London    Exercises  Supine Transversus Abdominis Bracing - Hands on Stomach - 1 x daily - 7 x weekly - 2 sets - 10 reps  Hooklying Sequential Leg March and Lower - 1 x daily - 7 x weekly - 2 sets - 10 reps  Supine Hamstring Stretch with Strap - 1 x daily - 7 x weekly - 1 sets - 3 reps - 30s hold  Seated Hamstring Stretch - 1 x daily - 7 x weekly - 1 sets - 3 reps - 30s hold  Prone Quadriceps Stretch with Strap - 1 x daily - 7 x weekly - 1 sets - 3 reps - 30s hold  Supine Lower Trunk Rotation - 1 x daily - 7 x weekly - 2 sets - 10 reps - 5s hold  Sidelying Thoracic Rotation with Open Book - 1 x daily - 7 x weekly - 2 sets - 10 reps - 5s hold  Supine Single Knee to Chest Stretch - 1 x daily - 7 x weekly - 2 sets - 10 reps      Plan: [x] Continue current frequency toward long and short term goals.     [x] Specific Instructions for subsequent treatments: progress per poc, update HEP      Time In: 9:00 am         Time Out: 9:55 am    Electronically signed by:  Sreekanth Ca PTA

## 2022-09-13 ENCOUNTER — APPOINTMENT (OUTPATIENT)
Dept: PHYSICAL THERAPY | Facility: CLINIC | Age: 67
End: 2022-09-13
Payer: MEDICARE

## 2022-09-15 ENCOUNTER — APPOINTMENT (OUTPATIENT)
Dept: PHYSICAL THERAPY | Facility: CLINIC | Age: 67
End: 2022-09-15
Payer: MEDICARE

## 2022-11-23 NOTE — DISCHARGE SUMMARY
[] [facility]) - [facility] &  Therapy  [address]  [phone]  F: [phone] [x] [address]  [facility]   [address]  P: [phone]  F: [phone] [] [address] &  Therapy  [address]  P: [phone]  F: [phone] [] [address]  P: [phone]  F: [phone] [] [address]  [address] [facility] [address]   [address]   [place]: [phone]  F: [phone]    Physical Therapy Discharge Note    Date: 2022      Patient: Dania Jacome  : 1955  MRN: [id number]    Physician: El Blizzard, APRN - CNP                      Insurance: MEDICARE (BOMN)/[employer]  Medical Diagnosis: M54.9, G89.29 (ICD-10-CM) - Chronic midline back pain, unspecified back location   Rehab Codes: M54.59, M25.551, M25.651, R29.3 M54.32  Onset Date: 22                 Next 's appt. : 23  Visit# / total visits: 10; Progress note for Medicare patient due at visit 6  Cancels/No shows: 0/0  Date of initial visit: 22                Date of final visit: 22       Discharge Status:     Pt failed to make additional appointments for therapy. Pt. Is now discharged. Electronically signed by: Carmela Jonas PT    If you have any questions or concerns, please don't hesitate to call.   Thank you for your referral.

## 2023-01-12 LAB
AVERAGE GLUCOSE: NORMAL
HBA1C MFR BLD: 6.6 %

## 2023-01-19 NOTE — ED NOTES
Pt advised no referral not required   Pt to room by ambulance. Pt c/o syncopal episode at doctors office this morning. Pt denies fall, states he was sitting down when he started to see black spots and lost consciousness. EMS states pt had another syncopal episode when they were checking orthostats. Pt denies any complaints at this time. Pt A&O x3, skin warm and dry, respirations equal and unlabored bilat, no neuro deficits.      Lisa Bailey, [address] [place] [address]  01/03/20 8723

## 2023-02-15 DIAGNOSIS — E11.9 TYPE 2 DIABETES MELLITUS WITHOUT COMPLICATION, WITHOUT LONG-TERM CURRENT USE OF INSULIN (HCC): ICD-10-CM

## 2023-02-15 RX ORDER — METFORMIN HYDROCHLORIDE 500 MG/1
TABLET, EXTENDED RELEASE ORAL
Qty: 90 TABLET | Refills: 1 | Status: SHIPPED | OUTPATIENT
Start: 2023-02-15

## 2023-02-15 NOTE — TELEPHONE ENCOUNTER
Polly Sethi is calling to request a refill on the following medication(s):    Medication Request:  Requested Prescriptions     Pending Prescriptions Disp Refills    metFORMIN (GLUCOPHAGE-XR) 500 MG extended release tablet [Pharmacy Med Name: METFORMIN HCL  MG TABLET] 90 tablet 1     Sig: TAKE ONE TABLET BY MOUTH EVERY MORNING       Last Visit Date (If Applicable):  7/2/3706    Next Visit Date:    3/21/2023

## 2023-02-16 ENCOUNTER — HOSPITAL ENCOUNTER (OUTPATIENT)
Age: 68
Setting detail: SPECIMEN
Discharge: HOME | End: 2023-02-16

## 2023-02-16 LAB — PROSTATE SPECIFIC ANTIGEN: 0.18 NG/ML

## 2023-03-03 ENCOUNTER — TELEPHONE (OUTPATIENT)
Dept: FAMILY MEDICINE CLINIC | Age: 68
End: 2023-03-03

## 2023-03-21 ENCOUNTER — OFFICE VISIT (OUTPATIENT)
Dept: FAMILY MEDICINE CLINIC | Age: 68
End: 2023-03-21
Payer: MEDICARE

## 2023-03-21 VITALS
SYSTOLIC BLOOD PRESSURE: 132 MMHG | DIASTOLIC BLOOD PRESSURE: 84 MMHG | HEIGHT: 71 IN | BODY MASS INDEX: 30.63 KG/M2 | TEMPERATURE: 97.5 F | WEIGHT: 218.8 LBS | OXYGEN SATURATION: 95 % | HEART RATE: 86 BPM

## 2023-03-21 DIAGNOSIS — Z12.11 SCREENING FOR MALIGNANT NEOPLASM OF COLON: ICD-10-CM

## 2023-03-21 DIAGNOSIS — E11.9 TYPE 2 DIABETES MELLITUS WITHOUT COMPLICATION, WITHOUT LONG-TERM CURRENT USE OF INSULIN (HCC): Primary | ICD-10-CM

## 2023-03-21 DIAGNOSIS — R55 NEUROCARDIOGENIC SYNCOPE: ICD-10-CM

## 2023-03-21 DIAGNOSIS — E78.00 PURE HYPERCHOLESTEROLEMIA: Chronic | ICD-10-CM

## 2023-03-21 PROCEDURE — 3046F HEMOGLOBIN A1C LEVEL >9.0%: CPT | Performed by: NURSE PRACTITIONER

## 2023-03-21 PROCEDURE — G8427 DOCREV CUR MEDS BY ELIG CLIN: HCPCS | Performed by: NURSE PRACTITIONER

## 2023-03-21 PROCEDURE — 2022F DILAT RTA XM EVC RTNOPTHY: CPT | Performed by: NURSE PRACTITIONER

## 2023-03-21 PROCEDURE — 1123F ACP DISCUSS/DSCN MKR DOCD: CPT | Performed by: NURSE PRACTITIONER

## 2023-03-21 PROCEDURE — 3017F COLORECTAL CA SCREEN DOC REV: CPT | Performed by: NURSE PRACTITIONER

## 2023-03-21 PROCEDURE — G8417 CALC BMI ABV UP PARAM F/U: HCPCS | Performed by: NURSE PRACTITIONER

## 2023-03-21 PROCEDURE — 99214 OFFICE O/P EST MOD 30 MIN: CPT | Performed by: NURSE PRACTITIONER

## 2023-03-21 PROCEDURE — 1036F TOBACCO NON-USER: CPT | Performed by: NURSE PRACTITIONER

## 2023-03-21 PROCEDURE — G8484 FLU IMMUNIZE NO ADMIN: HCPCS | Performed by: NURSE PRACTITIONER

## 2023-03-21 RX ORDER — METFORMIN HYDROCHLORIDE 500 MG/1
1000 TABLET, EXTENDED RELEASE ORAL EVERY MORNING
Qty: 180 TABLET | Refills: 2 | Status: SHIPPED | OUTPATIENT
Start: 2023-03-21 | End: 2023-06-19

## 2023-03-21 SDOH — ECONOMIC STABILITY: FOOD INSECURITY: WITHIN THE PAST 12 MONTHS, THE FOOD YOU BOUGHT JUST DIDN'T LAST AND YOU DIDN'T HAVE MONEY TO GET MORE.: NEVER TRUE

## 2023-03-21 SDOH — ECONOMIC STABILITY: HOUSING INSECURITY
IN THE LAST 12 MONTHS, WAS THERE A TIME WHEN YOU DID NOT HAVE A STEADY PLACE TO SLEEP OR SLEPT IN A SHELTER (INCLUDING NOW)?: NO

## 2023-03-21 SDOH — ECONOMIC STABILITY: FOOD INSECURITY: WITHIN THE PAST 12 MONTHS, YOU WORRIED THAT YOUR FOOD WOULD RUN OUT BEFORE YOU GOT MONEY TO BUY MORE.: NEVER TRUE

## 2023-03-21 SDOH — ECONOMIC STABILITY: INCOME INSECURITY: HOW HARD IS IT FOR YOU TO PAY FOR THE VERY BASICS LIKE FOOD, HOUSING, MEDICAL CARE, AND HEATING?: NOT HARD AT ALL

## 2023-03-21 ASSESSMENT — PATIENT HEALTH QUESTIONNAIRE - PHQ9
1. LITTLE INTEREST OR PLEASURE IN DOING THINGS: 0
SUM OF ALL RESPONSES TO PHQ9 QUESTIONS 1 & 2: 0
SUM OF ALL RESPONSES TO PHQ QUESTIONS 1-9: 0
2. FEELING DOWN, DEPRESSED OR HOPELESS: 0
SUM OF ALL RESPONSES TO PHQ QUESTIONS 1-9: 0

## 2023-03-21 NOTE — PROGRESS NOTES
sinus pain, sinus pressure, congestion. Eyes:  Negative for pain and discharge. Respiratory:  Negative for chest tightness, shortness of breath, wheezing, and cough. Cardiovascular:  Negative for chest pain, palpitations and leg swelling. Gastrointestinal: Negative for abdominal pain, blood in stool, constipation,diarrhea, nausea and vomiting. Endocrine: Negative for cold intolerance, heat intolerance, polydipsia, polyphagia and polyuria. Genitourinary: Negative for difficulty urinating, dysuria, flank pain, frequency, hematuria and urgency. Musculoskeletal: Negative for arthralgias, back pain, joint swelling, myalgias, neck pain and neck stiffness. Skin: Negative for rash and wound. Allergic/Immunologic: Negative for environmental allergies and food allergies. Neurological:  Negative for dizziness, light-headedness, numbness and headaches. Hematological:  Negative for adenopathy. Does not bruise/bleed easily. Psychiatric/Behavioral: Negative for self-injury, sleep disturbance and suicidal ideas. Objective   Physical Exam   PHYSICAL EXAM:   Constitutional: Soni Bob is oriented to person, place, and time. Vital signs are normal. Appears well-developed and well-nourished. Head: Normocephalic and atraumatic. Eyes:PERRL, EOMI, Conjunctiva normal, No discharge. Neck: Full passive range of motion. Non-tender on palpation. Neck supple. No thyromegaly present. Trachea normal.  Cardiovascular: Normal rate, regular rhythm, S1, S2, no murmur, no gallop, no friction rub, intact distal pulses. Pulmonary/Chest: Breath sounds are clear throughout, No respiratory distress, No wheezing, No chest tenderness. Effort normal  Abdominal: Soft. Normal appearance, bowel sounds are present and normoactive. There is no hepatosplenomegaly. There is no tenderness. There is no CVA tenderness. Musculoskeletal: Extremities appear regular and symmetric.  No evident masses, lesions, foreign bodies, or other

## 2023-12-07 ENCOUNTER — COMMUNITY OUTREACH (OUTPATIENT)
Dept: FAMILY MEDICINE CLINIC | Age: 68
End: 2023-12-07

## 2023-12-12 ENCOUNTER — TELEPHONE (OUTPATIENT)
Dept: FAMILY MEDICINE CLINIC | Age: 68
End: 2023-12-12

## 2024-02-21 DIAGNOSIS — E11.9 TYPE 2 DIABETES MELLITUS WITHOUT COMPLICATION, WITHOUT LONG-TERM CURRENT USE OF INSULIN (HCC): ICD-10-CM

## 2024-02-22 RX ORDER — METFORMIN HYDROCHLORIDE 500 MG/1
TABLET, EXTENDED RELEASE ORAL
Qty: 180 TABLET | Refills: 0 | Status: SHIPPED | OUTPATIENT
Start: 2024-02-22

## 2024-02-22 NOTE — TELEPHONE ENCOUNTER
Héctor Wray is calling to request a refill on the following medication(s):    Medication Request:  Requested Prescriptions     Pending Prescriptions Disp Refills    metFORMIN (GLUCOPHAGE-XR) 500 MG extended release tablet [Pharmacy Med Name: METFORMIN HCL  MG TABLET] 180 tablet 0     Sig: TAKE TWO TABLETS BY MOUTH EVERY MORNING       Last Visit Date (If Applicable):  3/21/2023    Next Visit Date:    Visit date not found

## 2024-03-29 ENCOUNTER — TRANSCRIBE ORDERS (OUTPATIENT)
Dept: ADMINISTRATIVE | Age: 69
End: 2024-03-29

## 2024-03-29 DIAGNOSIS — I51.89 OTHER ILL-DEFINED HEART DISEASES: Primary | ICD-10-CM

## 2024-04-29 SDOH — ECONOMIC STABILITY: HOUSING INSECURITY
IN THE LAST 12 MONTHS, WAS THERE A TIME WHEN YOU DID NOT HAVE A STEADY PLACE TO SLEEP OR SLEPT IN A SHELTER (INCLUDING NOW)?: PATIENT DECLINED

## 2024-04-29 SDOH — ECONOMIC STABILITY: FOOD INSECURITY: WITHIN THE PAST 12 MONTHS, YOU WORRIED THAT YOUR FOOD WOULD RUN OUT BEFORE YOU GOT MONEY TO BUY MORE.: PATIENT DECLINED

## 2024-04-29 SDOH — ECONOMIC STABILITY: FOOD INSECURITY: WITHIN THE PAST 12 MONTHS, THE FOOD YOU BOUGHT JUST DIDN'T LAST AND YOU DIDN'T HAVE MONEY TO GET MORE.: PATIENT DECLINED

## 2024-04-29 SDOH — ECONOMIC STABILITY: TRANSPORTATION INSECURITY
IN THE PAST 12 MONTHS, HAS LACK OF TRANSPORTATION KEPT YOU FROM MEETINGS, WORK, OR FROM GETTING THINGS NEEDED FOR DAILY LIVING?: PATIENT DECLINED

## 2024-04-29 SDOH — ECONOMIC STABILITY: INCOME INSECURITY: HOW HARD IS IT FOR YOU TO PAY FOR THE VERY BASICS LIKE FOOD, HOUSING, MEDICAL CARE, AND HEATING?: PATIENT DECLINED

## 2024-04-29 ASSESSMENT — PATIENT HEALTH QUESTIONNAIRE - PHQ9
SUM OF ALL RESPONSES TO PHQ QUESTIONS 1-9: 0
1. LITTLE INTEREST OR PLEASURE IN DOING THINGS: NOT AT ALL
SUM OF ALL RESPONSES TO PHQ QUESTIONS 1-9: 0
SUM OF ALL RESPONSES TO PHQ QUESTIONS 1-9: 0
SUM OF ALL RESPONSES TO PHQ9 QUESTIONS 1 & 2: 0
1. LITTLE INTEREST OR PLEASURE IN DOING THINGS: NOT AT ALL
SUM OF ALL RESPONSES TO PHQ QUESTIONS 1-9: 0
2. FEELING DOWN, DEPRESSED OR HOPELESS: NOT AT ALL
SUM OF ALL RESPONSES TO PHQ9 QUESTIONS 1 & 2: 0
2. FEELING DOWN, DEPRESSED OR HOPELESS: NOT AT ALL

## 2024-04-30 ENCOUNTER — OFFICE VISIT (OUTPATIENT)
Dept: FAMILY MEDICINE CLINIC | Age: 69
End: 2024-04-30
Payer: MEDICARE

## 2024-04-30 VITALS
TEMPERATURE: 97.4 F | DIASTOLIC BLOOD PRESSURE: 74 MMHG | BODY MASS INDEX: 29.6 KG/M2 | SYSTOLIC BLOOD PRESSURE: 138 MMHG | WEIGHT: 212.2 LBS | OXYGEN SATURATION: 98 % | HEART RATE: 82 BPM

## 2024-04-30 DIAGNOSIS — E11.9 TYPE 2 DIABETES MELLITUS WITHOUT COMPLICATION, WITHOUT LONG-TERM CURRENT USE OF INSULIN (HCC): Primary | ICD-10-CM

## 2024-04-30 DIAGNOSIS — R55 NEUROCARDIOGENIC SYNCOPE: ICD-10-CM

## 2024-04-30 DIAGNOSIS — E55.9 VITAMIN D DEFICIENCY: ICD-10-CM

## 2024-04-30 DIAGNOSIS — E78.00 PURE HYPERCHOLESTEROLEMIA: ICD-10-CM

## 2024-04-30 DIAGNOSIS — Z85.46 HISTORY OF PROSTATE CANCER: ICD-10-CM

## 2024-04-30 PROCEDURE — 3046F HEMOGLOBIN A1C LEVEL >9.0%: CPT | Performed by: NURSE PRACTITIONER

## 2024-04-30 PROCEDURE — 2022F DILAT RTA XM EVC RTNOPTHY: CPT | Performed by: NURSE PRACTITIONER

## 2024-04-30 PROCEDURE — 99214 OFFICE O/P EST MOD 30 MIN: CPT | Performed by: NURSE PRACTITIONER

## 2024-04-30 PROCEDURE — G8419 CALC BMI OUT NRM PARAM NOF/U: HCPCS | Performed by: NURSE PRACTITIONER

## 2024-04-30 PROCEDURE — 1123F ACP DISCUSS/DSCN MKR DOCD: CPT | Performed by: NURSE PRACTITIONER

## 2024-04-30 PROCEDURE — G8427 DOCREV CUR MEDS BY ELIG CLIN: HCPCS | Performed by: NURSE PRACTITIONER

## 2024-04-30 PROCEDURE — 3017F COLORECTAL CA SCREEN DOC REV: CPT | Performed by: NURSE PRACTITIONER

## 2024-04-30 PROCEDURE — 1036F TOBACCO NON-USER: CPT | Performed by: NURSE PRACTITIONER

## 2024-04-30 NOTE — PROGRESS NOTES
Jayla De La Torre, [facility]  [address]  [place]  [address]  [phone]  [phone]    Héctor Wray is a 69 y.o. male who is here with c/o of:    Chief Complaint: Diabetes      Patient Accompanied by: n/a    HPI - Héctor Wray is here today with c/o:    Patient here for re evaluation of chronic conditions.      Type 2 diabetes-  Compliant with medication. Has been checking his blood sugars and averaging <100.      Hyperlipidemia-  No medication at this time.     Hypotension/neurocardiogenic syncope-  Follows with Cardiology. Was taken off Florinef.     Hx prostate ca-  Follows with Urology    Health Maintenance Due   Topic Date Due    Diabetic foot exam  Never done    Diabetic retinal exam  Never done    Diabetic Alb to Cr ratio (uACR) test  08/19/2023    Lipids  08/19/2023    GFR test (Diabetes, CKD 3-4, OR last GFR 15-59)  08/19/2023    Annual Wellness Visit (Medicare)  Never done    A1C test (Diabetic or Prediabetic)  01/12/2024        Patient Active Problem List:     Hyperlipidemia     Orthostatic hypotension     Type 2 diabetes mellitus without complication, without long-term current use of insulin (HCC)     Neurocardiogenic syncope     Grade I diastolic dysfunction     Past Medical History:   Diagnosis Date    Cardiac syncope 1994    Diabetes mellitus (HCC)     Hyperlipidemia       Past Surgical History:   Procedure Laterality Date    ANKLE SURGERY Left 1970    COLONOSCOPY  2010    INGUINAL HERNIA REPAIR Right 1987    LASIK  1999    NASAL SEPTUM SURGERY  1994    TONSILLECTOMY  1971     History reviewed. No pertinent family history.  Social History     Tobacco Use    Smoking status: Never    Smokeless tobacco: Never   Substance Use Topics    Alcohol use: No     ALLERGIES:    Allergies   Allergen Reactions    Erythromycin Nausea And Vomiting          Subjective   Review of Systems   Constitutional:  Negative for activity change, appetite change,unexpected

## 2024-05-28 ENCOUNTER — HOSPITAL ENCOUNTER (OUTPATIENT)
Age: 69
Discharge: HOME | End: 2024-05-30
Payer: MEDICARE

## 2024-05-28 DIAGNOSIS — I51.89 OTHER ILL-DEFINED HEART DISEASES: ICD-10-CM

## 2024-05-28 LAB
ECHO AO ROOT DIAM: 3.4 CM
ECHO AV AREA PEAK VELOCITY: 2.3 CM2
ECHO AV AREA VTI: 2 CM2
ECHO AV MEAN GRADIENT: 4 MMHG
ECHO AV MEAN VELOCITY: 1 M/S
ECHO AV PEAK GRADIENT: 7 MMHG
ECHO AV PEAK VELOCITY: 1.3 M/S
ECHO AV VELOCITY RATIO: 0.85
ECHO AV VTI: 34.4 CM
ECHO EST RA PRESSURE: 3 MMHG
ECHO LA AREA 2C: 18.5 CM2
ECHO LA AREA 4C: 21.3 CM2
ECHO LA DIAMETER: 4.3 CM
ECHO LA MAJOR AXIS: 6.3 CM
ECHO LA MINOR AXIS: 4.9 CM
ECHO LA TO AORTIC ROOT RATIO: 1.26
ECHO LA VOL MOD A2C: 58 ML (ref 18–58)
ECHO LA VOL MOD A4C: 55 ML (ref 18–58)
ECHO LV E' LATERAL VELOCITY: 8 CM/S
ECHO LV E' SEPTAL VELOCITY: 5 CM/S
ECHO LV EDV A2C: 71 ML
ECHO LV EDV A4C: 90 ML
ECHO LV EJECTION FRACTION A2C: 56 %
ECHO LV EJECTION FRACTION A4C: 62 %
ECHO LV EJECTION FRACTION BIPLANE: 59 % (ref 55–100)
ECHO LV ESV A2C: 31 ML
ECHO LV ESV A4C: 35 ML
ECHO LV FRACTIONAL SHORTENING: 34 % (ref 28–44)
ECHO LV INTERNAL DIMENSION DIASTOLIC: 4.4 CM (ref 4.2–5.9)
ECHO LV INTERNAL DIMENSION SYSTOLIC: 2.9 CM
ECHO LV IVSD: 1.1 CM (ref 0.6–1)
ECHO LV MASS 2D: 180 G (ref 88–224)
ECHO LV POSTERIOR WALL DIASTOLIC: 1.2 CM (ref 0.6–1)
ECHO LV RELATIVE WALL THICKNESS RATIO: 0.55
ECHO LVOT AREA: 2.8 CM2
ECHO LVOT AV VTI INDEX: 0.7
ECHO LVOT DIAM: 1.9 CM
ECHO LVOT MEAN GRADIENT: 2 MMHG
ECHO LVOT PEAK GRADIENT: 4 MMHG
ECHO LVOT PEAK VELOCITY: 1.1 M/S
ECHO LVOT SV: 68.6 ML
ECHO LVOT VTI: 24.2 CM
ECHO MV A VELOCITY: 0.69 M/S
ECHO MV E DECELERATION TIME (DT): 210 MS
ECHO MV E VELOCITY: 0.54 M/S
ECHO MV E/A RATIO: 0.78
ECHO MV E/E' LATERAL: 6.75
ECHO MV E/E' RATIO (AVERAGED): 8.78
ECHO MV E/E' SEPTAL: 10.8
ECHO RA AREA 4C: 17.6 CM2
ECHO RA VOLUME: 41 ML
ECHO RIGHT VENTRICULAR SYSTOLIC PRESSURE (RVSP): 29 MMHG
ECHO RV BASAL DIMENSION: 3.4 CM
ECHO RV FREE WALL PEAK S': 13 CM/S
ECHO RV TAPSE: 2.4 CM (ref 1.7–?)
ECHO TV REGURGITANT MAX VELOCITY: 2.57 M/S
ECHO TV REGURGITANT PEAK GRADIENT: 26 MMHG

## 2024-05-28 PROCEDURE — 93306 TTE W/DOPPLER COMPLETE: CPT

## 2024-06-03 DIAGNOSIS — E11.9 TYPE 2 DIABETES MELLITUS WITHOUT COMPLICATION, WITHOUT LONG-TERM CURRENT USE OF INSULIN (HCC): ICD-10-CM

## 2024-06-03 RX ORDER — METFORMIN HYDROCHLORIDE 500 MG/1
1000 TABLET, EXTENDED RELEASE ORAL EVERY MORNING
Qty: 180 TABLET | Refills: 3 | Status: SHIPPED | OUTPATIENT
Start: 2024-06-03

## 2024-06-03 NOTE — TELEPHONE ENCOUNTER
Héctor Wray is calling to request a refill on the following medication(s):    Medication Request:  Requested Prescriptions     Pending Prescriptions Disp Refills    metFORMIN (GLUCOPHAGE-XR) 500 MG extended release tablet [Pharmacy Med Name: METFORMIN HCL  MG TABLET] 180 tablet 3     Sig: TAKE 2 TABLETS BY MOUTH EVERY MORNING       Last Visit Date (If Applicable):  4/30/2024    Next Visit Date:    5/1/2025

## 2024-08-26 ENCOUNTER — OFFICE VISIT (OUTPATIENT)
Dept: FAMILY MEDICINE CLINIC | Age: 69
End: 2024-08-26
Payer: MEDICARE

## 2024-08-26 VITALS
WEIGHT: 212.2 LBS | HEART RATE: 90 BPM | OXYGEN SATURATION: 97 % | BODY MASS INDEX: 29.6 KG/M2 | DIASTOLIC BLOOD PRESSURE: 70 MMHG | TEMPERATURE: 97.7 F | SYSTOLIC BLOOD PRESSURE: 132 MMHG

## 2024-08-26 DIAGNOSIS — N18.31 STAGE 3A CHRONIC KIDNEY DISEASE (HCC): ICD-10-CM

## 2024-08-26 DIAGNOSIS — S49.92XD INJURY OF LEFT SHOULDER, SUBSEQUENT ENCOUNTER: ICD-10-CM

## 2024-08-26 DIAGNOSIS — E11.22 TYPE 2 DIABETES MELLITUS WITH STAGE 3A CHRONIC KIDNEY DISEASE, WITHOUT LONG-TERM CURRENT USE OF INSULIN (HCC): ICD-10-CM

## 2024-08-26 DIAGNOSIS — Z09 HOSPITAL DISCHARGE FOLLOW-UP: Primary | ICD-10-CM

## 2024-08-26 DIAGNOSIS — N18.31 TYPE 2 DIABETES MELLITUS WITH STAGE 3A CHRONIC KIDNEY DISEASE, WITHOUT LONG-TERM CURRENT USE OF INSULIN (HCC): ICD-10-CM
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
## 2024-08-26 LAB — HBA1C MFR BLD: 6.5 %

## 2024-08-26 PROCEDURE — 99214 OFFICE O/P EST MOD 30 MIN: CPT | Performed by: NURSE PRACTITIONER

## 2024-08-26 PROCEDURE — G8417 CALC BMI ABV UP PARAM F/U: HCPCS | Performed by: NURSE PRACTITIONER

## 2024-08-26 PROCEDURE — 2022F DILAT RTA XM EVC RTNOPTHY: CPT | Performed by: NURSE PRACTITIONER

## 2024-08-26 PROCEDURE — 3044F HG A1C LEVEL LT 7.0%: CPT | Performed by: NURSE PRACTITIONER

## 2024-08-26 PROCEDURE — 83036 HEMOGLOBIN GLYCOSYLATED A1C: CPT | Performed by: NURSE PRACTITIONER

## 2024-08-26 PROCEDURE — G8427 DOCREV CUR MEDS BY ELIG CLIN: HCPCS | Performed by: NURSE PRACTITIONER

## 2024-08-26 PROCEDURE — 1036F TOBACCO NON-USER: CPT | Performed by: NURSE PRACTITIONER

## 2024-08-26 PROCEDURE — 3017F COLORECTAL CA SCREEN DOC REV: CPT | Performed by: NURSE PRACTITIONER

## 2024-08-26 PROCEDURE — 1123F ACP DISCUSS/DSCN MKR DOCD: CPT | Performed by: NURSE PRACTITIONER

## 2024-08-26 RX ORDER — METFORMIN HCL 500 MG
500 TABLET, EXTENDED RELEASE 24 HR ORAL
Qty: 180 TABLET | Refills: 0 | Status: SHIPPED
Start: 2024-08-26 | End: 2024-08-26 | Stop reason: DRUGHIGH

## 2024-08-26 RX ORDER — TRIAMCINOLONE ACETONIDE 40 MG/ML
40 INJECTION, SUSPENSION INTRA-ARTICULAR; INTRAMUSCULAR ONCE
Status: SHIPPED | OUTPATIENT
Start: 2024-08-26

## 2024-08-26 RX ORDER — METFORMIN HCL 500 MG
500 TABLET, EXTENDED RELEASE 24 HR ORAL
Qty: 90 TABLET | Refills: 0 | Status: SHIPPED
Start: 2024-08-26 | End: 2024-11-24

## 2024-08-26 RX ORDER — LIDOCAINE HYDROCHLORIDE 10 MG/ML
5 INJECTION, SOLUTION INFILTRATION; PERINEURAL ONCE
Status: SHIPPED | OUTPATIENT
Start: 2024-08-26

## 2024-08-26 ASSESSMENT — PATIENT HEALTH QUESTIONNAIRE - PHQ9
SUM OF ALL RESPONSES TO PHQ9 QUESTIONS 1 & 2: 0
SUM OF ALL RESPONSES TO PHQ QUESTIONS 1-9: 0
SUM OF ALL RESPONSES TO PHQ QUESTIONS 1-9: 0
1. LITTLE INTEREST OR PLEASURE IN DOING THINGS: NOT AT ALL
2. FEELING DOWN, DEPRESSED OR HOPELESS: NOT AT ALL
SUM OF ALL RESPONSES TO PHQ QUESTIONS 1-9: 0
SUM OF ALL RESPONSES TO PHQ QUESTIONS 1-9: 0

## 2024-08-26 NOTE — PROGRESS NOTES
MG extended release tablet; Take 1 tablet by mouth daily (with breakfast)  Dispense: 90 tablet; Refill: 0  - Hemoglobin A1C; Future  - Comprehensive Metabolic Panel; Future      Return in about 3 months (around 11/26/2024) for chronic conditions.    1.  Héctor received counseling on the following healthy behaviors: nutrition, exercise, and medication adherence  2.  Patient given educational materials - see patient instructions  3.  Was a self-tracking handout given in paper form or via [employer]? No  If yes, see orders or list here.  4.  Discussed use, benefit, and side effects of prescribed medications.  Barriers to medication compliance addressed.  All patient questions answered.  Pt voiced understanding.   5.  Reviewed prior labs, imaging, consultation, follow up, and health maintenance  6.  Continue current medications, diet and exercise.  7. Discussed use, benefit, and side effects of prescribed medications.  Barriers to medication compliance addressed.  All her questions were answered.  Pt voiced understanding. Héctor will continue current medications, diet and exercise.    Of the  30  minute duration appointment visit, Jayla De La Torre CNP spent at least 50% of the face-to-face time in counseling, explanation of diagnosis, planning of further management, and answering all questions.          Signed:  Jayla De La Torre CNP

## 2024-08-29 ENCOUNTER — HOSPITAL ENCOUNTER (OUTPATIENT)
Dept: MRI IMAGING | Age: 69
Discharge: HOME | End: 2024-08-31
Payer: MEDICARE

## 2024-08-29 DIAGNOSIS — S49.92XD INJURY OF LEFT SHOULDER, SUBSEQUENT ENCOUNTER: ICD-10-CM

## 2024-08-29 DIAGNOSIS — R93.89 ABNORMAL MRI: ICD-10-CM
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
## 2024-08-29 DIAGNOSIS — S49.92XD INJURY OF LEFT SHOULDER, SUBSEQUENT ENCOUNTER: Primary | ICD-10-CM

## 2024-08-29 PROCEDURE — 73221 MRI JOINT UPR EXTREM W/O DYE: CPT

## 2024-09-19 ENCOUNTER — TELEPHONE (OUTPATIENT)
Dept: FAMILY MEDICINE CLINIC | Age: 69
End: 2024-09-19

## 2024-09-19 ENCOUNTER — HOSPITAL ENCOUNTER (OUTPATIENT)
Dept: NUTRITION | Age: 69
Setting detail: THERAPIES SERIES
Discharge: HOME | End: 2024-09-19
Payer: MEDICARE

## 2024-09-19 VITALS — BODY MASS INDEX: 29.6 KG/M2 | HEIGHT: 71 IN

## 2024-09-19 DIAGNOSIS — N18.31 STAGE 3A CHRONIC KIDNEY DISEASE (HCC): Primary | ICD-10-CM

## 2024-09-19 PROCEDURE — 97802 MEDICAL NUTRITION INDIV IN: CPT

## 2024-11-11 ENCOUNTER — TELEPHONE (OUTPATIENT)
Dept: FAMILY MEDICINE CLINIC | Age: 69
End: 2024-11-11

## 2024-11-11 NOTE — TELEPHONE ENCOUNTER
I am working my work-q patient was to see Tor Gamino but it looks like he went to [facility].    May I take him out of my work-q?

## 2024-12-19 ENCOUNTER — HOSPITAL ENCOUNTER (OUTPATIENT)
Age: 69
Setting detail: SPECIMEN
Discharge: HOME | End: 2024-12-19

## 2024-12-19 DIAGNOSIS — N18.31 TYPE 2 DIABETES MELLITUS WITH STAGE 3A CHRONIC KIDNEY DISEASE, WITHOUT LONG-TERM CURRENT USE OF INSULIN (HCC): ICD-10-CM

## 2024-12-19 DIAGNOSIS — E11.22 TYPE 2 DIABETES MELLITUS WITH STAGE 3A CHRONIC KIDNEY DISEASE, WITHOUT LONG-TERM CURRENT USE OF INSULIN (HCC): ICD-10-CM

## 2024-12-19 LAB
ALBUMIN SERPL-MCNC: 4.7 G/DL (ref 3.5–5.2)
ALBUMIN/GLOB SERPL: 2 {RATIO} (ref 1–2.5)
ALP SERPL-CCNC: 56 U/L (ref 40–129)
ALT SERPL-CCNC: 24 U/L (ref 10–50)
ANION GAP SERPL CALCULATED.3IONS-SCNC: 9 MMOL/L (ref 9–16)
AST SERPL-CCNC: 26 U/L (ref 10–50)
BILIRUB SERPL-MCNC: 0.5 MG/DL (ref 0–1.2)
BUN SERPL-MCNC: 23 MG/DL (ref 8–23)
CALCIUM SERPL-MCNC: 9.6 MG/DL (ref 8.6–10.4)
CHLORIDE SERPL-SCNC: 103 MMOL/L (ref 98–107)
CO2 SERPL-SCNC: 27 MMOL/L (ref 20–31)
CREAT SERPL-MCNC: 1.4 MG/DL (ref 0.7–1.2)
EST. AVERAGE GLUCOSE BLD GHB EST-MCNC: 180 MG/DL
GFR, ESTIMATED: 54 ML/MIN/1.73M2
GLUCOSE SERPL-MCNC: 138 MG/DL (ref 74–99)
HBA1C MFR BLD: 7.9 % (ref 4–6)
POTASSIUM SERPL-SCNC: 4.2 MMOL/L (ref 3.7–5.3)
PROT SERPL-MCNC: 7.1 G/DL (ref 6.6–8.7)
SODIUM SERPL-SCNC: 139 MMOL/L (ref 136–145)

## 2024-12-23 ENCOUNTER — OFFICE VISIT (OUTPATIENT)
Dept: FAMILY MEDICINE CLINIC | Age: 69
End: 2024-12-23
Payer: MEDICARE

## 2024-12-23 VITALS
HEART RATE: 74 BPM | SYSTOLIC BLOOD PRESSURE: 142 MMHG | OXYGEN SATURATION: 98 % | BODY MASS INDEX: 30.27 KG/M2 | DIASTOLIC BLOOD PRESSURE: 98 MMHG | TEMPERATURE: 97.3 F | WEIGHT: 217 LBS

## 2024-12-23 DIAGNOSIS — N18.31 TYPE 2 DIABETES MELLITUS WITH STAGE 3A CHRONIC KIDNEY DISEASE, WITHOUT LONG-TERM CURRENT USE OF INSULIN (HCC): Primary | ICD-10-CM

## 2024-12-23 DIAGNOSIS — Z23 IMMUNIZATION DUE: ICD-10-CM

## 2024-12-23 DIAGNOSIS — E11.22 TYPE 2 DIABETES MELLITUS WITH STAGE 3A CHRONIC KIDNEY DISEASE, WITHOUT LONG-TERM CURRENT USE OF INSULIN (HCC): Primary | ICD-10-CM

## 2024-12-23 PROCEDURE — 1036F TOBACCO NON-USER: CPT | Performed by: NURSE PRACTITIONER

## 2024-12-23 PROCEDURE — 99214 OFFICE O/P EST MOD 30 MIN: CPT | Performed by: NURSE PRACTITIONER

## 2024-12-23 PROCEDURE — G8427 DOCREV CUR MEDS BY ELIG CLIN: HCPCS | Performed by: NURSE PRACTITIONER

## 2024-12-23 PROCEDURE — G8482 FLU IMMUNIZE ORDER/ADMIN: HCPCS | Performed by: NURSE PRACTITIONER

## 2024-12-23 PROCEDURE — 1159F MED LIST DOCD IN RCRD: CPT | Performed by: NURSE PRACTITIONER

## 2024-12-23 PROCEDURE — G8417 CALC BMI ABV UP PARAM F/U: HCPCS | Performed by: NURSE PRACTITIONER

## 2024-12-23 PROCEDURE — 3051F HG A1C>EQUAL 7.0%<8.0%: CPT | Performed by: NURSE PRACTITIONER

## 2024-12-23 PROCEDURE — 1160F RVW MEDS BY RX/DR IN RCRD: CPT | Performed by: NURSE PRACTITIONER

## 2024-12-23 PROCEDURE — 1123F ACP DISCUSS/DSCN MKR DOCD: CPT | Performed by: NURSE PRACTITIONER

## 2024-12-23 PROCEDURE — 2022F DILAT RTA XM EVC RTNOPTHY: CPT | Performed by: NURSE PRACTITIONER

## 2024-12-23 PROCEDURE — 90653 IIV ADJUVANT VACCINE IM: CPT | Performed by: NURSE PRACTITIONER

## 2024-12-23 PROCEDURE — 3017F COLORECTAL CA SCREEN DOC REV: CPT | Performed by: NURSE PRACTITIONER

## 2024-12-23 PROCEDURE — G0008 ADMIN INFLUENZA VIRUS VAC: HCPCS | Performed by: NURSE PRACTITIONER

## 2024-12-23 RX ORDER — DAPAGLIFLOZIN 10 MG/1
10 TABLET, FILM COATED ORAL EVERY MORNING
Qty: 90 TABLET | Refills: 0 | Status: SHIPPED | OUTPATIENT
Start: 2024-12-23

## 2024-12-23 SDOH — ECONOMIC STABILITY: FOOD INSECURITY: WITHIN THE PAST 12 MONTHS, YOU WORRIED THAT YOUR FOOD WOULD RUN OUT BEFORE YOU GOT MONEY TO BUY MORE.: NEVER TRUE

## 2024-12-23 SDOH — ECONOMIC STABILITY: FOOD INSECURITY: WITHIN THE PAST 12 MONTHS, THE FOOD YOU BOUGHT JUST DIDN'T LAST AND YOU DIDN'T HAVE MONEY TO GET MORE.: NEVER TRUE

## 2024-12-23 SDOH — ECONOMIC STABILITY: INCOME INSECURITY: HOW HARD IS IT FOR YOU TO PAY FOR THE VERY BASICS LIKE FOOD, HOUSING, MEDICAL CARE, AND HEATING?: NOT HARD AT ALL

## 2024-12-23 ASSESSMENT — PATIENT HEALTH QUESTIONNAIRE - PHQ9
1. LITTLE INTEREST OR PLEASURE IN DOING THINGS: NOT AT ALL
SUM OF ALL RESPONSES TO PHQ QUESTIONS 1-9: 0
2. FEELING DOWN, DEPRESSED OR HOPELESS: NOT AT ALL
SUM OF ALL RESPONSES TO PHQ QUESTIONS 1-9: 0
SUM OF ALL RESPONSES TO PHQ9 QUESTIONS 1 & 2: 0

## 2024-12-23 NOTE — PROGRESS NOTES
Jayla De La Torre, [facility]  [address]  [place]  [address]  [phone]  [phone]    Héctor Wray is a 69 y.o. male who is here with c/o of:    Chief Complaint: Diabetes      Patient Accompanied by: patient and wife    HPI - Héctor Wray is here today with c/o:    History of Present Illness  The patient is a 69-year-old male here for reevaluation of chronic conditions.    He has been adhering to a diet prescribed by a dietitian, which includes high-glycemic and carbohydrate-rich foods such as oatmeal, pasta, and grains. He is considering reverting to his previous dietary regimen, which was primarily composed of fruits and vegetables. He has been monitoring his blood glucose levels at home, with readings typically ranging from 85 to 98 in the morning. He suspects that his current diet may be contributing to elevated blood glucose levels. He undergoes annual eye examinations at [facility], with the most recent one conducted in 08/2024 yielding normal results. His current medication regimen includes metformin, administered three times weekly on Tuesday, Thursday, and Saturday mornings.    He has been making efforts to reduce his intake of salt, red meat, and protein. He has an upcoming appointment with a nephrologist on 01/06/2025.    He has been monitoring his blood pressure at home, with readings typically around 110/70 or 75.    He is still going to therapy for his rotator cuff. He had an MRI which showed a complete tear, a fracture, and dislocation. He was told to continue therapy for 6 more weeks.    MEDICATIONS  Current: Metformin       Health Maintenance Due   Topic Date Due    Diabetic foot exam  Never done    Diabetic Alb to Cr ratio (uACR) test  08/19/2023    Lipids  08/19/2023    Annual Wellness Visit (Medicare)  Never done    COVID-19 Vaccine (5 - 2023-24 season) 09/01/2024    Diabetic retinal exam  09/07/2024        Patient Active Problem List:

## 2025-01-06 ENCOUNTER — HOSPITAL ENCOUNTER (OUTPATIENT)
Age: 70
Setting detail: SPECIMEN
Discharge: HOME | End: 2025-01-06

## 2025-01-06 DIAGNOSIS — E11.22 TYPE 2 DIABETES MELLITUS WITH STAGE 3A CHRONIC KIDNEY DISEASE, WITHOUT LONG-TERM CURRENT USE OF INSULIN (HCC): ICD-10-CM

## 2025-01-06 DIAGNOSIS — Z85.46 HISTORY OF PROSTATE CANCER: ICD-10-CM

## 2025-01-06 DIAGNOSIS — R53.83 FATIGUE, UNSPECIFIED TYPE: ICD-10-CM

## 2025-01-06 DIAGNOSIS — N18.31 STAGE 3A CHRONIC KIDNEY DISEASE (HCC): ICD-10-CM

## 2025-01-06 DIAGNOSIS — N18.31 TYPE 2 DIABETES MELLITUS WITH STAGE 3A CHRONIC KIDNEY DISEASE, WITHOUT LONG-TERM CURRENT USE OF INSULIN (HCC): ICD-10-CM

## 2025-01-06 DIAGNOSIS — R55 NEUROCARDIOGENIC SYNCOPE: ICD-10-CM

## 2025-01-06 DIAGNOSIS — E11.9 TYPE 2 DIABETES MELLITUS WITHOUT COMPLICATION, WITHOUT LONG-TERM CURRENT USE OF INSULIN (HCC): ICD-10-CM

## 2025-01-06 LAB
ALBUMIN SERPL-MCNC: 4.7 G/DL (ref 3.5–5.2)
ANION GAP SERPL CALCULATED.3IONS-SCNC: 10 MMOL/L (ref 9–16)
BILIRUB UR QL STRIP: NEGATIVE
BUN SERPL-MCNC: 19 MG/DL (ref 8–23)
C3 SERPL-MCNC: 140 MG/DL (ref 90–180)
C4 SERPL-MCNC: 22 MG/DL (ref 10–40)
CALCIUM SERPL-MCNC: 10.1 MG/DL (ref 8.6–10.4)
CHLORIDE SERPL-SCNC: 103 MMOL/L (ref 98–107)
CLARITY UR: CLEAR
CO2 SERPL-SCNC: 26 MMOL/L (ref 20–31)
COLOR UR: YELLOW
COMMENT: NORMAL
CREAT SERPL-MCNC: 1.5 MG/DL (ref 0.7–1.2)
CREAT UR-MCNC: 200 MG/DL (ref 39–259)
CREAT UR-MCNC: 200 MG/DL (ref 39–259)
ERYTHROCYTE [DISTWIDTH] IN BLOOD BY AUTOMATED COUNT: 12.1 % (ref 11.8–14.4)
FREE KAPPA/LAMBDA RATIO: 1.44 (ref 0.22–1.74)
GFR, ESTIMATED: 49 ML/MIN/1.73M2
GLUCOSE SERPL-MCNC: 122 MG/DL (ref 74–99)
GLUCOSE UR STRIP-MCNC: NEGATIVE MG/DL
HAV IGM SERPL QL IA: NONREACTIVE
HBV CORE IGM SERPL QL IA: NONREACTIVE
HBV SURFACE AG SERPL QL IA: NONREACTIVE
HCT VFR BLD AUTO: 46.8 % (ref 40.7–50.3)
HCV AB SERPL QL IA: NONREACTIVE
HGB BLD-MCNC: 15.7 G/DL (ref 13–17)
HGB UR QL STRIP.AUTO: NEGATIVE
KAPPA LC FREE SER-MCNC: 33.6 MG/L
KETONES UR STRIP-MCNC: NEGATIVE MG/DL
LAMBDA LC FREE SERPL-MCNC: 23.3 MG/L (ref 4.2–27.7)
LEUKOCYTE ESTERASE UR QL STRIP: NEGATIVE
MCH RBC QN AUTO: 31.3 PG (ref 25.2–33.5)
MCHC RBC AUTO-ENTMCNC: 33.5 G/DL (ref 28.4–34.8)
MCV RBC AUTO: 93.4 FL (ref 82.6–102.9)
MICROALBUMIN UR-MCNC: <12 MG/L (ref 0–20)
MICROALBUMIN/CREAT UR-RTO: NORMAL MCG/MG CREAT (ref 0–17)
NITRITE UR QL STRIP: NEGATIVE
NRBC BLD-RTO: 0 PER 100 WBC
PH UR STRIP: 5 [PH] (ref 5–8)
PLATELET # BLD AUTO: 216 K/UL (ref 138–453)
PMV BLD AUTO: 9.9 FL (ref 8.1–13.5)
POTASSIUM SERPL-SCNC: 4.6 MMOL/L (ref 3.7–5.3)
PROT UR STRIP-MCNC: NEGATIVE MG/DL
RBC # BLD AUTO: 5.01 M/UL (ref 4.21–5.77)
SODIUM SERPL-SCNC: 139 MMOL/L (ref 136–145)
SP GR UR STRIP: 1.02 (ref 1–1.03)
TOTAL PROTEIN, URINE: 9 MG/DL
URINE TOTAL PROTEIN CREATININE RATIO: 0.05 (ref 0–0.2)
UROBILINOGEN UR STRIP-ACNC: NORMAL EU/DL (ref 0–1)
WBC OTHER # BLD: 5.9 K/UL (ref 3.5–11.3)

## 2025-01-07 LAB
ALBUMIN PERCENT: 63 % (ref 45–65)
ALBUMIN SERPL-MCNC: 4.6 G/DL (ref 3.2–5.2)
ALPHA 2 PERCENT: 9 % (ref 6–13)
ALPHA1 GLOB SERPL ELPH-MCNC: 0.2 G/DL (ref 0.1–0.4)
ALPHA1 GLOB SERPL ELPH-MCNC: 3 % (ref 3–6)
ALPHA2 GLOB SERPL ELPH-MCNC: 0.7 G/DL (ref 0.5–0.9)
ANA SER QL IA: ABNORMAL
ANCA MYELOPEROXIDASE: 0.3 AU/ML (ref 0–3.5)
ANCA PROTEINASE 3: <0.7 AU/ML (ref 0–2)
B-GLOBULIN SERPL ELPH-MCNC: 0.7 G/DL (ref 0.5–1.1)
B-GLOBULIN SERPL ELPH-MCNC: 10 % (ref 11–19)
DSDNA IGG SER QL IA: 12 IU/ML
GAMMA GLOB SERPL ELPH-MCNC: 1.1 G/DL (ref 0.5–1.5)
GAMMA GLOBULIN %: 15 % (ref 9–20)
NUCLEAR IGG SER IA-RTO: 0.3 U/ML
PATHOLOGIST: ABNORMAL
PROT PATTERN SERPL ELPH-IMP: ABNORMAL
PROT SERPL-MCNC: 7.4 G/DL (ref 6.6–8.7)
TOTAL PROT. SUM,%: 100 % (ref 98–102)
TOTAL PROT. SUM: 7.3 G/DL (ref 6.3–8.2)

## 2025-01-09 ENCOUNTER — HOSPITAL ENCOUNTER (OUTPATIENT)
Dept: ULTRASOUND IMAGING | Age: 70
Discharge: HOME | End: 2025-01-11
Payer: MEDICARE

## 2025-01-09 DIAGNOSIS — N18.31 STAGE 3A CHRONIC KIDNEY DISEASE (HCC): ICD-10-CM

## 2025-01-09 DIAGNOSIS — N18.31 TYPE 2 DIABETES MELLITUS WITH STAGE 3A CHRONIC KIDNEY DISEASE, WITHOUT LONG-TERM CURRENT USE OF INSULIN (HCC): ICD-10-CM

## 2025-01-09 DIAGNOSIS — E11.9 TYPE 2 DIABETES MELLITUS WITHOUT COMPLICATION, WITHOUT LONG-TERM CURRENT USE OF INSULIN (HCC): ICD-10-CM

## 2025-01-09 DIAGNOSIS — R53.83 FATIGUE, UNSPECIFIED TYPE: ICD-10-CM

## 2025-01-09 DIAGNOSIS — Z85.46 HISTORY OF PROSTATE CANCER: ICD-10-CM

## 2025-01-09 DIAGNOSIS — E11.22 TYPE 2 DIABETES MELLITUS WITH STAGE 3A CHRONIC KIDNEY DISEASE, WITHOUT LONG-TERM CURRENT USE OF INSULIN (HCC): ICD-10-CM

## 2025-01-09 DIAGNOSIS — R55 NEUROCARDIOGENIC SYNCOPE: ICD-10-CM

## 2025-01-09 PROCEDURE — 76770 US EXAM ABDO BACK WALL COMP: CPT

## 2025-01-17 ENCOUNTER — HOSPITAL ENCOUNTER (OUTPATIENT)
Age: 70
Setting detail: SPECIMEN
Discharge: HOME | End: 2025-01-17

## 2025-01-17 DIAGNOSIS — R35.0 INCREASED FREQUENCY OF URINATION: ICD-10-CM

## 2025-01-17 DIAGNOSIS — R39.89 SENSATION OF PRESSURE IN BLADDER AREA: ICD-10-CM

## 2025-01-17 LAB
BILIRUB UR QL STRIP: NEGATIVE
CLARITY UR: CLEAR
COLOR UR: YELLOW
COMMENT: ABNORMAL
GLUCOSE UR STRIP-MCNC: ABNORMAL MG/DL
HGB UR QL STRIP.AUTO: NEGATIVE
KETONES UR STRIP-MCNC: NEGATIVE MG/DL
LEUKOCYTE ESTERASE UR QL STRIP: NEGATIVE
NITRITE UR QL STRIP: NEGATIVE
PH UR STRIP: 5 [PH] (ref 5–8)
PROT UR STRIP-MCNC: NEGATIVE MG/DL
SP GR UR STRIP: 1.02 (ref 1–1.03)
UROBILINOGEN UR STRIP-ACNC: NORMAL EU/DL (ref 0–1)

## 2025-01-18 LAB
MICROORGANISM SPEC CULT: NO GROWTH
SERVICE CMNT-IMP: NORMAL
SPECIMEN DESCRIPTION: NORMAL

## 2025-02-12 ENCOUNTER — HOSPITAL ENCOUNTER (OUTPATIENT)
Age: 70
Setting detail: SPECIMEN
Discharge: HOME | End: 2025-02-12

## 2025-02-12 DIAGNOSIS — N18.31 TYPE 2 DIABETES MELLITUS WITH STAGE 3A CHRONIC KIDNEY DISEASE, WITHOUT LONG-TERM CURRENT USE OF INSULIN (HCC): ICD-10-CM

## 2025-02-12 DIAGNOSIS — N18.31 STAGE 3A CHRONIC KIDNEY DISEASE (HCC): ICD-10-CM

## 2025-02-12 DIAGNOSIS — E11.22 TYPE 2 DIABETES MELLITUS WITH STAGE 3A CHRONIC KIDNEY DISEASE, WITHOUT LONG-TERM CURRENT USE OF INSULIN (HCC): ICD-10-CM

## 2025-02-12 DIAGNOSIS — R55 NEUROCARDIOGENIC SYNCOPE: ICD-10-CM

## 2025-02-12 LAB
ANION GAP SERPL CALCULATED.3IONS-SCNC: 12 MMOL/L (ref 9–16)
BUN SERPL-MCNC: 16 MG/DL (ref 8–23)
CALCIUM SERPL-MCNC: 10.1 MG/DL (ref 8.6–10.4)
CHLORIDE SERPL-SCNC: 105 MMOL/L (ref 98–107)
CO2 SERPL-SCNC: 24 MMOL/L (ref 20–31)
CREAT SERPL-MCNC: 1.7 MG/DL (ref 0.7–1.2)
CREAT UR-MCNC: 119 MG/DL (ref 39–259)
GFR, ESTIMATED: 43 ML/MIN/1.73M2
GLUCOSE SERPL-MCNC: 105 MG/DL (ref 74–99)
POTASSIUM SERPL-SCNC: 4.6 MMOL/L (ref 3.7–5.3)
SODIUM SERPL-SCNC: 141 MMOL/L (ref 136–145)
TOTAL PROTEIN, URINE: 13 MG/DL
URINE TOTAL PROTEIN CREATININE RATIO: 0.11 (ref 0–0.2)

## 2025-06-16 ENCOUNTER — HOSPITAL ENCOUNTER (OUTPATIENT)
Age: 70
Setting detail: SPECIMEN
Discharge: HOME | End: 2025-06-16

## 2025-06-16 DIAGNOSIS — R55 NEUROCARDIOGENIC SYNCOPE: ICD-10-CM

## 2025-06-16 DIAGNOSIS — N18.31 STAGE 3A CHRONIC KIDNEY DISEASE (HCC): ICD-10-CM

## 2025-06-16 DIAGNOSIS — N18.31 TYPE 2 DIABETES MELLITUS WITH STAGE 3A CHRONIC KIDNEY DISEASE, WITHOUT LONG-TERM CURRENT USE OF INSULIN (HCC): ICD-10-CM

## 2025-06-16 DIAGNOSIS — E11.22 TYPE 2 DIABETES MELLITUS WITH STAGE 3A CHRONIC KIDNEY DISEASE, WITHOUT LONG-TERM CURRENT USE OF INSULIN (HCC): ICD-10-CM

## 2025-06-16 LAB
ALBUMIN SERPL-MCNC: 4.4 G/DL (ref 3.5–5.2)
ANION GAP SERPL CALCULATED.3IONS-SCNC: 10 MMOL/L (ref 9–16)
BUN SERPL-MCNC: 19 MG/DL (ref 8–23)
CALCIUM SERPL-MCNC: 9.7 MG/DL (ref 8.6–10.4)
CHLORIDE SERPL-SCNC: 105 MMOL/L (ref 98–107)
CO2 SERPL-SCNC: 23 MMOL/L (ref 20–31)
CREAT SERPL-MCNC: 1.5 MG/DL (ref 0.7–1.2)
CREAT UR-MCNC: 147 MG/DL (ref 39–259)
ERYTHROCYTE [DISTWIDTH] IN BLOOD BY AUTOMATED COUNT: 12.4 % (ref 11.8–14.4)
GFR, ESTIMATED: 49 ML/MIN/1.73M2
GLUCOSE SERPL-MCNC: 120 MG/DL (ref 74–99)
HCT VFR BLD AUTO: 47.3 % (ref 40.7–50.3)
HGB BLD-MCNC: 16.3 G/DL (ref 13–17)
MCH RBC QN AUTO: 31.7 PG (ref 25.2–33.5)
MCHC RBC AUTO-ENTMCNC: 34.5 G/DL (ref 28.4–34.8)
MCV RBC AUTO: 91.8 FL (ref 82.6–102.9)
NRBC BLD-RTO: 0 PER 100 WBC
PLATELET # BLD AUTO: 194 K/UL (ref 138–453)
PMV BLD AUTO: 10.2 FL (ref 8.1–13.5)
POTASSIUM SERPL-SCNC: 4.4 MMOL/L (ref 3.7–5.3)
RBC # BLD AUTO: 5.15 M/UL (ref 4.21–5.77)
SODIUM SERPL-SCNC: 138 MMOL/L (ref 136–145)
TOTAL PROTEIN, URINE: 7 MG/DL
URINE TOTAL PROTEIN CREATININE RATIO: 0.05 (ref 0–0.2)
WBC OTHER # BLD: 5.6 K/UL (ref 3.5–11.3)

## 2025-06-23 ENCOUNTER — TELEPHONE (OUTPATIENT)
Dept: FAMILY MEDICINE CLINIC | Age: 70
End: 2025-06-23

## 2025-07-18 ENCOUNTER — HOSPITAL ENCOUNTER (EMERGENCY)
Age: 70
Discharge: HOME | End: 2025-07-18
Attending: EMERGENCY MEDICINE
Payer: MEDICARE

## 2025-07-18 ENCOUNTER — APPOINTMENT (OUTPATIENT)
Dept: GENERAL RADIOLOGY | Age: 70
End: 2025-07-18
Payer: MEDICARE

## 2025-07-18 VITALS
DIASTOLIC BLOOD PRESSURE: 74 MMHG | TEMPERATURE: 97.5 F | HEART RATE: 70 BPM | RESPIRATION RATE: 18 BRPM | BODY MASS INDEX: 29.29 KG/M2 | OXYGEN SATURATION: 97 % | WEIGHT: 210 LBS | SYSTOLIC BLOOD PRESSURE: 136 MMHG

## 2025-07-18 DIAGNOSIS — M77.8 TENDONITIS OF WRIST, RIGHT: Primary | ICD-10-CM

## 2025-07-18 PROCEDURE — 73110 X-RAY EXAM OF WRIST: CPT

## 2025-07-18 PROCEDURE — 99283 EMERGENCY DEPT VISIT LOW MDM: CPT

## 2025-07-18 ASSESSMENT — PAIN - FUNCTIONAL ASSESSMENT: PAIN_FUNCTIONAL_ASSESSMENT: 0-10

## 2025-07-18 ASSESSMENT — PAIN SCALES - GENERAL: PAINLEVEL_OUTOF10: 4

## 2025-07-18 NOTE — DISCHARGE INSTRUCTIONS
Please wear the splint provided at all times especially when sleeping.  Please decrease pickleball playing time until you are seen by the orthopedist.

## 2025-07-19 NOTE — ED PROVIDER NOTES
[facility]  Emergency Medicine Department    Pt Name: Héctor Wray  MRN: [id number]  Birthdate 1955  Date of evaluation: 2025  Provider: Hansel Mitchell MD    CHIEF COMPLAINT     Chief Complaint   Patient presents with    Wrist Pain     R. Injured playing pickleball       HISTORY OF PRESENT ILLNESS  (Location/Symptom, Timing/Onset, Context/Setting,Quality, Duration, Modifying Factors, Severity.)   Héctor Wray is a 70 y.o. male who presents to the emergency department presenting with right wrist pain on the ulnar aspect. The patient has been experiencing this pain for several days and notices it more prominently when playing pickleball, which she plays frequently. She denies any specific injuries including falls and reports never having similar pain in the past. The patient has not taken any medications for the pain.    Nursing Notes were reviewed.    ALLERGIES     Erythromycin    CURRENT MEDICATIONS       Discharge Medication List as of 2025 11:50 AM        CONTINUE these medications which have NOT CHANGED    Details   dapagliflozin (FARXIGA) 10 MG tablet Take 1 tablet by mouth every morning, Disp-90 tablet, R-0Normal      citalopram (CELEXA) 20 MG tablet Take 1 tablet by mouth dailyHistorical Med             PAST MEDICAL HISTORY         Diagnosis Date    Cardiac syncope     Diabetes mellitus (HCC)     Hyperlipidemia        SURGICAL HISTORY           Procedure Laterality Date    ANKLE SURGERY Left     COLONOSCOPY      INGUINAL HERNIA REPAIR Right     LASIK      NASAL SEPTUM SURGERY      TONSILLECTOMY         FAMILY HISTORY     No family history on file.  Family Status   Relation Name Status    Mother      Father     No partnership data on file        SOCIAL HISTORY      reports that he has never smoked. He has never used smokeless tobacco. He reports that he does not drink alcohol and does not use drugs.    PHYSICAL EXAM       ED

## 2025-08-18 ENCOUNTER — TRANSCRIBE ORDERS (OUTPATIENT)
Dept: ADMINISTRATIVE | Age: 70
End: 2025-08-18

## 2025-08-18 DIAGNOSIS — M77.8 TENDINITIS OF RIGHT WRIST: Primary | ICD-10-CM
